# Patient Record
Sex: MALE | URBAN - METROPOLITAN AREA
[De-identification: names, ages, dates, MRNs, and addresses within clinical notes are randomized per-mention and may not be internally consistent; named-entity substitution may affect disease eponyms.]

---

## 2017-08-07 ENCOUNTER — TELEPHONE (OUTPATIENT)
Dept: SURGERY | Facility: AMBULATORY SURGERY CENTER | Age: 52
End: 2017-08-07

## 2017-08-07 NOTE — TELEPHONE ENCOUNTER
Called patient to collect HB payment of $3200 for Dr. Cardenas [facility] case DOS 08/18/17.     Left message for patient to call back with payment.

## 2017-08-15 ENCOUNTER — ANESTHESIA EVENT (OUTPATIENT)
Dept: SURGERY | Facility: AMBULATORY SURGERY CENTER | Age: 52
End: 2017-08-15

## 2017-08-15 NOTE — ANESTHESIA PREPROCEDURE EVALUATION
Anesthesia Evaluation     . Pt has had prior anesthetic. Type: General    History of anesthetic complications   - PONV        ROS/MED HX    ENT/Pulmonary: Comment: Desires Rhinoplasty and Chin Implant - neg pulmonary ROS     Neurologic:  - neg neurologic ROS     Cardiovascular:  - neg cardiovascular ROS       METS/Exercise Tolerance:     Hematologic:  - neg hematologic  ROS       Musculoskeletal:  - neg musculoskeletal ROS       GI/Hepatic:  - neg GI/hepatic ROS       Renal/Genitourinary:  - ROS Renal section negative       Endo:  - neg endo ROS       Psychiatric:  - neg psychiatric ROS       Infectious Disease:  - neg infectious disease ROS       Malignancy:      - no malignancy   Other:    (+) C-spine cleared: N/A, no H/O Chronic Pain,  - neg other ROS                 Physical Exam  Normal systems: cardiovascular and pulmonary    Airway   Mallampati: II  TM distance: >3 FB  Neck ROM: full    Dental   Comment: Cosmetic Veneers    Cardiovascular   Rhythm and rate: regular and normal      Pulmonary    breath sounds clear to auscultation                    Anesthesia Plan      History & Physical Review  History and physical reviewed and following examination; no interval change.    ASA Status:  1 .    NPO Status:  > 6 hours    Plan for General and ETT with Intravenous and Propofol induction. Maintenance will be TIVA.    PONV prophylaxis:  Ondansetron (or other 5HT-3) and Dexamethasone or Solumedrol       Postoperative Care  Postoperative pain management:  IV analgesics.      Consents  Anesthetic plan, risks, benefits and alternatives discussed with:  Patient.  Use of blood products discussed: No .   .                          .

## 2017-08-18 ENCOUNTER — HOSPITAL ENCOUNTER (OUTPATIENT)
Facility: AMBULATORY SURGERY CENTER | Age: 52
Discharge: HOME | End: 2017-08-18
Attending: PLASTIC SURGERY | Admitting: PLASTIC SURGERY

## 2017-08-18 ENCOUNTER — ANESTHESIA (OUTPATIENT)
Dept: SURGERY | Facility: AMBULATORY SURGERY CENTER | Age: 52
End: 2017-08-18

## 2017-08-18 VITALS
SYSTOLIC BLOOD PRESSURE: 100 MMHG | OXYGEN SATURATION: 95 % | DIASTOLIC BLOOD PRESSURE: 62 MMHG | RESPIRATION RATE: 12 BRPM | TEMPERATURE: 99.2 F

## 2017-08-18 DIAGNOSIS — T88.59XA NAUSEA AFTER ANESTHESIA, INITIAL ENCOUNTER: ICD-10-CM

## 2017-08-18 DIAGNOSIS — R52 PAIN: Primary | ICD-10-CM

## 2017-08-18 DIAGNOSIS — B99.9 INFECTION: ICD-10-CM

## 2017-08-18 DIAGNOSIS — R11.0 NAUSEA AFTER ANESTHESIA, INITIAL ENCOUNTER: ICD-10-CM

## 2017-08-18 PROCEDURE — 21120 GENIOPLASTY AUGMENTATION: CPT

## 2017-08-18 PROCEDURE — 30420 RECONSTRUCTION OF NOSE: CPT

## 2017-08-18 PROCEDURE — G8916 PT W IV AB GIVEN ON TIME: HCPCS

## 2017-08-18 PROCEDURE — G8907 PT DOC NO EVENTS ON DISCHARG: HCPCS

## 2017-08-18 PROCEDURE — 30400 RECONSTRUCTION OF NOSE: CPT

## 2017-08-18 RX ORDER — PROPOFOL 10 MG/ML
INJECTION, EMULSION INTRAVENOUS CONTINUOUS PRN
Status: DISCONTINUED | OUTPATIENT
Start: 2017-08-18 | End: 2017-08-18

## 2017-08-18 RX ORDER — FENTANYL CITRATE 50 UG/ML
25-50 INJECTION, SOLUTION INTRAMUSCULAR; INTRAVENOUS
Status: DISCONTINUED | OUTPATIENT
Start: 2017-08-18 | End: 2017-08-19 | Stop reason: HOSPADM

## 2017-08-18 RX ORDER — MEPERIDINE HYDROCHLORIDE 25 MG/ML
12.5 INJECTION INTRAMUSCULAR; INTRAVENOUS; SUBCUTANEOUS
Status: DISCONTINUED | OUTPATIENT
Start: 2017-08-18 | End: 2017-08-19 | Stop reason: HOSPADM

## 2017-08-18 RX ORDER — ONDANSETRON 4 MG/1
8 TABLET, ORALLY DISINTEGRATING ORAL EVERY 6 HOURS PRN
Qty: 8 TABLET | Refills: 0
Start: 2017-08-18

## 2017-08-18 RX ORDER — OXYCODONE AND ACETAMINOPHEN 5; 325 MG/1; MG/1
2 TABLET ORAL
Status: DISCONTINUED | OUTPATIENT
Start: 2017-08-18 | End: 2017-08-19 | Stop reason: HOSPADM

## 2017-08-18 RX ORDER — GABAPENTIN 300 MG/1
300 CAPSULE ORAL ONCE
Status: DISCONTINUED | OUTPATIENT
Start: 2017-08-18 | End: 2017-08-18

## 2017-08-18 RX ORDER — HYDROXYZINE PAMOATE 25 MG/1
25 CAPSULE ORAL EVERY 6 HOURS PRN
Qty: 30 CAPSULE | Refills: 0
Start: 2017-08-18

## 2017-08-18 RX ORDER — DEXAMETHASONE SODIUM PHOSPHATE 4 MG/ML
10 INJECTION, SOLUTION INTRA-ARTICULAR; INTRALESIONAL; INTRAMUSCULAR; INTRAVENOUS; SOFT TISSUE ONCE
Status: DISCONTINUED | OUTPATIENT
Start: 2017-08-18 | End: 2017-08-19 | Stop reason: HOSPADM

## 2017-08-18 RX ORDER — SODIUM CHLORIDE, SODIUM LACTATE, POTASSIUM CHLORIDE, CALCIUM CHLORIDE 600; 310; 30; 20 MG/100ML; MG/100ML; MG/100ML; MG/100ML
INJECTION, SOLUTION INTRAVENOUS CONTINUOUS
Status: DISCONTINUED | OUTPATIENT
Start: 2017-08-18 | End: 2017-08-19 | Stop reason: HOSPADM

## 2017-08-18 RX ORDER — CEFAZOLIN SODIUM 1 G/3ML
1 INJECTION, POWDER, FOR SOLUTION INTRAMUSCULAR; INTRAVENOUS SEE ADMIN INSTRUCTIONS
Status: DISCONTINUED | OUTPATIENT
Start: 2017-08-18 | End: 2017-08-19 | Stop reason: HOSPADM

## 2017-08-18 RX ORDER — ONDANSETRON 4 MG/1
4 TABLET, ORALLY DISINTEGRATING ORAL
Status: DISCONTINUED | OUTPATIENT
Start: 2017-08-18 | End: 2017-08-19 | Stop reason: HOSPADM

## 2017-08-18 RX ORDER — LIDOCAINE 40 MG/G
CREAM TOPICAL
Status: DISCONTINUED | OUTPATIENT
Start: 2017-08-18 | End: 2017-08-19 | Stop reason: HOSPADM

## 2017-08-18 RX ORDER — ONDANSETRON 4 MG/1
4 TABLET, ORALLY DISINTEGRATING ORAL EVERY 30 MIN PRN
Status: DISCONTINUED | OUTPATIENT
Start: 2017-08-18 | End: 2017-08-19 | Stop reason: HOSPADM

## 2017-08-18 RX ORDER — LIDOCAINE HYDROCHLORIDE 20 MG/ML
INJECTION, SOLUTION INFILTRATION; PERINEURAL PRN
Status: DISCONTINUED | OUTPATIENT
Start: 2017-08-18 | End: 2017-08-18

## 2017-08-18 RX ORDER — GINSENG 100 MG
CAPSULE ORAL PRN
Status: DISCONTINUED | OUTPATIENT
Start: 2017-08-18 | End: 2017-08-18 | Stop reason: HOSPADM

## 2017-08-18 RX ORDER — SCOLOPAMINE TRANSDERMAL SYSTEM 1 MG/1
1 PATCH, EXTENDED RELEASE TRANSDERMAL ONCE
Status: COMPLETED | OUTPATIENT
Start: 2017-08-18 | End: 2017-08-18

## 2017-08-18 RX ORDER — NALOXONE HYDROCHLORIDE 0.4 MG/ML
.1-.4 INJECTION, SOLUTION INTRAMUSCULAR; INTRAVENOUS; SUBCUTANEOUS
Status: DISCONTINUED | OUTPATIENT
Start: 2017-08-18 | End: 2017-08-19 | Stop reason: HOSPADM

## 2017-08-18 RX ORDER — PROPOFOL 10 MG/ML
INJECTION, EMULSION INTRAVENOUS PRN
Status: DISCONTINUED | OUTPATIENT
Start: 2017-08-18 | End: 2017-08-18

## 2017-08-18 RX ORDER — OXYCODONE AND ACETAMINOPHEN 5; 325 MG/1; MG/1
1-2 TABLET ORAL EVERY 4 HOURS PRN
Qty: 30 TABLET | Refills: 0
Start: 2017-08-18

## 2017-08-18 RX ORDER — LIDOCAINE HYDROCHLORIDE AND EPINEPHRINE 15; 5 MG/ML; UG/ML
INJECTION, SOLUTION EPIDURAL PRN
Status: DISCONTINUED | OUTPATIENT
Start: 2017-08-18 | End: 2017-08-18 | Stop reason: HOSPADM

## 2017-08-18 RX ORDER — CEFAZOLIN SODIUM 2 G/100ML
2 INJECTION, SOLUTION INTRAVENOUS
Status: COMPLETED | OUTPATIENT
Start: 2017-08-18 | End: 2017-08-18

## 2017-08-18 RX ORDER — ACETAMINOPHEN 10 MG/ML
INJECTION, SOLUTION INTRAVENOUS PRN
Status: DISCONTINUED | OUTPATIENT
Start: 2017-08-18 | End: 2017-08-18

## 2017-08-18 RX ORDER — DEXAMETHASONE SODIUM PHOSPHATE 4 MG/ML
10 INJECTION, SOLUTION INTRA-ARTICULAR; INTRALESIONAL; INTRAMUSCULAR; INTRAVENOUS; SOFT TISSUE ONCE
Status: COMPLETED | OUTPATIENT
Start: 2017-08-18 | End: 2017-08-18

## 2017-08-18 RX ORDER — ISOSULFAN BLUE 50 MG/5ML
INJECTION, SOLUTION SUBCUTANEOUS PRN
Status: DISCONTINUED | OUTPATIENT
Start: 2017-08-18 | End: 2017-08-18 | Stop reason: HOSPADM

## 2017-08-18 RX ORDER — KETOROLAC TROMETHAMINE 30 MG/ML
30 INJECTION, SOLUTION INTRAMUSCULAR; INTRAVENOUS EVERY 6 HOURS PRN
Status: DISCONTINUED | OUTPATIENT
Start: 2017-08-18 | End: 2017-08-19 | Stop reason: HOSPADM

## 2017-08-18 RX ORDER — BUPIVACAINE HYDROCHLORIDE 2.5 MG/ML
INJECTION, SOLUTION INFILTRATION; PERINEURAL PRN
Status: DISCONTINUED | OUTPATIENT
Start: 2017-08-18 | End: 2017-08-18 | Stop reason: HOSPADM

## 2017-08-18 RX ORDER — DEXAMETHASONE SODIUM PHOSPHATE 4 MG/ML
4 INJECTION, SOLUTION INTRA-ARTICULAR; INTRALESIONAL; INTRAMUSCULAR; INTRAVENOUS; SOFT TISSUE EVERY 10 MIN PRN
Status: DISCONTINUED | OUTPATIENT
Start: 2017-08-18 | End: 2017-08-19 | Stop reason: HOSPADM

## 2017-08-18 RX ORDER — HYDROMORPHONE HYDROCHLORIDE 1 MG/ML
.3-.5 INJECTION, SOLUTION INTRAMUSCULAR; INTRAVENOUS; SUBCUTANEOUS EVERY 10 MIN PRN
Status: DISCONTINUED | OUTPATIENT
Start: 2017-08-18 | End: 2017-08-19 | Stop reason: HOSPADM

## 2017-08-18 RX ORDER — HYDROXYZINE HYDROCHLORIDE 25 MG/1
25 TABLET, FILM COATED ORAL
Status: DISCONTINUED | OUTPATIENT
Start: 2017-08-18 | End: 2017-08-19 | Stop reason: HOSPADM

## 2017-08-18 RX ORDER — ONDANSETRON 2 MG/ML
4 INJECTION INTRAMUSCULAR; INTRAVENOUS EVERY 30 MIN PRN
Status: DISCONTINUED | OUTPATIENT
Start: 2017-08-18 | End: 2017-08-19 | Stop reason: HOSPADM

## 2017-08-18 RX ORDER — CEPHALEXIN 500 MG/1
500 CAPSULE ORAL 2 TIMES DAILY
Qty: 14 CAPSULE | Refills: 0
Start: 2017-08-18 | End: 2017-08-25

## 2017-08-18 RX ORDER — ALBUTEROL SULFATE 0.83 MG/ML
2.5 SOLUTION RESPIRATORY (INHALATION) EVERY 4 HOURS PRN
Status: DISCONTINUED | OUTPATIENT
Start: 2017-08-18 | End: 2017-08-19 | Stop reason: HOSPADM

## 2017-08-18 RX ORDER — ACETAMINOPHEN 325 MG/1
975 TABLET ORAL ONCE
Status: DISCONTINUED | OUTPATIENT
Start: 2017-08-18 | End: 2017-08-18 | Stop reason: CLARIF

## 2017-08-18 RX ORDER — HYDROCODONE BITARTRATE AND ACETAMINOPHEN 5; 325 MG/1; MG/1
1-2 TABLET ORAL EVERY 4 HOURS PRN
Qty: 30 TABLET | Refills: 0
Start: 2017-08-18

## 2017-08-18 RX ORDER — ONDANSETRON 2 MG/ML
INJECTION INTRAMUSCULAR; INTRAVENOUS PRN
Status: DISCONTINUED | OUTPATIENT
Start: 2017-08-18 | End: 2017-08-18

## 2017-08-18 RX ORDER — CEFAZOLIN SODIUM 2 G/100ML
2 INJECTION, SOLUTION INTRAVENOUS
Status: DISCONTINUED | OUTPATIENT
Start: 2017-08-18 | End: 2017-08-19 | Stop reason: HOSPADM

## 2017-08-18 RX ADMIN — Medication 1 MG: at 07:25

## 2017-08-18 RX ADMIN — SODIUM CHLORIDE, SODIUM LACTATE, POTASSIUM CHLORIDE, CALCIUM CHLORIDE: 600; 310; 30; 20 INJECTION, SOLUTION INTRAVENOUS at 12:16

## 2017-08-18 RX ADMIN — PROPOFOL 200 MG: 10 INJECTION, EMULSION INTRAVENOUS at 07:25

## 2017-08-18 RX ADMIN — SCOLOPAMINE TRANSDERMAL SYSTEM 1 PATCH: 1 PATCH, EXTENDED RELEASE TRANSDERMAL at 07:22

## 2017-08-18 RX ADMIN — LIDOCAINE HYDROCHLORIDE 60 MG: 20 INJECTION, SOLUTION INFILTRATION; PERINEURAL at 07:25

## 2017-08-18 RX ADMIN — CEFAZOLIN SODIUM 1 G: 2 INJECTION, SOLUTION INTRAVENOUS at 11:35

## 2017-08-18 RX ADMIN — Medication 0.5 MG: at 11:55

## 2017-08-18 RX ADMIN — PROPOFOL 175 MCG/KG/MIN: 10 INJECTION, EMULSION INTRAVENOUS at 07:25

## 2017-08-18 RX ADMIN — CEFAZOLIN SODIUM 2 G: 2 INJECTION, SOLUTION INTRAVENOUS at 07:35

## 2017-08-18 RX ADMIN — Medication 25 MG: at 07:25

## 2017-08-18 RX ADMIN — SODIUM CHLORIDE, SODIUM LACTATE, POTASSIUM CHLORIDE, CALCIUM CHLORIDE: 600; 310; 30; 20 INJECTION, SOLUTION INTRAVENOUS at 07:20

## 2017-08-18 RX ADMIN — DEXAMETHASONE SODIUM PHOSPHATE 10 MG: 4 INJECTION, SOLUTION INTRA-ARTICULAR; INTRALESIONAL; INTRAMUSCULAR; INTRAVENOUS; SOFT TISSUE at 07:30

## 2017-08-18 RX ADMIN — Medication 0.5 MG: at 09:35

## 2017-08-18 RX ADMIN — CEFAZOLIN SODIUM 1 G: 2 INJECTION, SOLUTION INTRAVENOUS at 09:35

## 2017-08-18 RX ADMIN — ONDANSETRON 4 MG: 2 INJECTION INTRAMUSCULAR; INTRAVENOUS at 11:55

## 2017-08-18 RX ADMIN — ACETAMINOPHEN 1000 MG: 10 INJECTION, SOLUTION INTRAVENOUS at 07:30

## 2017-08-18 NOTE — DISCHARGE INSTRUCTIONS
[facility]  Same-Day Surgery   Adult Discharge Orders & Instructions   For 24 hours after surgery  1. Get plenty of rest.  A responsible adult must stay with you for at least 24 hours after you leave the hospital.   2. Do not drive or use heavy equipment.  If you have weakness or tingling, don't drive or use heavy equipment until this feeling goes away.  3. Do not drink alcohol.  4. Avoid strenuous or risky activities.  Ask for help when climbing stairs.   5. You may feel lightheaded.  IF so, sit for a few minutes before standing.  Have someone help you get up.   6. If you have nausea (feel sick to your stomach): Drink only clear liquids such as apple juice, ginger ale, broth or 7-Up.  Rest may also help.  Be sure to drink enough fluids.  Move to a regular diet as you feel able.  7. You may have a slight fever. Call the doctor if your fever is over 100 F (37.7 C) (taken under the tongue) or lasts longer than 24 hours.  8. You may have a dry mouth, a sore throat, muscle aches or trouble sleeping.  These should go away after 24 hours.  9. Do not make important or legal decisions.   Call your doctor for any of the followin.  Signs of infection (fever, growing tenderness at the surgery site, a large amount of drainage or bleeding, severe pain, foul-smelling drainage, redness, swelling).    2. It has been over 8 to 10 hours since surgery and you are still not able to urinate (pass water).    3.  Headache for over 24 hours.    4.  Numbness, tingling or weakness the day after surgery (if you had spinal anesthesia).  To contact a Dr Cardenas call:    [phone] - Day  [phone] - After hours pager

## 2017-08-18 NOTE — ANESTHESIA POSTPROCEDURE EVALUATION
Patient: Crys Mendoza    Procedure(s):  Rhinoplasty and chin implant - Wound Class: II-Clean Contaminated    Diagnosis:cosmetic rhinoplasty, chin implant  Diagnosis Additional Information: No value filed.    Anesthesia Type:  General, ETT    Note:  Anesthesia Post Evaluation    Patient location during evaluation: PACU and Bedside  Patient participation: Able to fully participate in evaluation  Level of consciousness: awake  Pain management: adequate  Airway patency: patent  Cardiovascular status: acceptable  Respiratory status: acceptable  Hydration status: acceptable  PONV: none     Anesthetic complications: None          Last vitals:  Vitals:    08/18/17 1345 08/18/17 1400 08/18/17 1415   BP: 100/69 102/64 103/67   Resp: 14 12 12   Temp:      SpO2: 97% 94% 96%         Electronically Signed By: Nathen Knox MD  August 18, 2017  2:45 PM

## 2017-08-18 NOTE — IP AVS SNAPSHOT
[facility]    [address] [place] [address]    Phone:  [phone]                                       After Visit Summary   8/18/2017    Crys Mendoza    MRN: [id number]           After Visit Summary Signature Page     I have received my discharge instructions, and my questions have been answered. I have discussed any challenges I see with this plan with the nurse or doctor.    ..........................................................................................................................................  Patient/Patient Representative Signature      ..........................................................................................................................................  Patient Representative Print Name and Relationship to Patient    ..................................................               ................................................  Date                                            Time    ..........................................................................................................................................  Reviewed by Signature/Title    ...................................................              ..............................................  Date                                                            Time

## 2017-08-18 NOTE — IP AVS SNAPSHOT
MRN:[id number]                      After Visit Summary   8/18/2017    Crys Mendoza    MRN: [id number]           Thank you!     Thank you for choosing [facility] for your care. Our goal is always to provide you with excellent care. Hearing back from our patients is one way we can continue to improve our services. Please take a few minutes to complete the written survey that you may receive in the mail after you visit with us. Thank you!        Patient Information     Date Of Birth          1965        About your hospital stay     You were admitted on:  August 18, 2017 You last received care in the:  [facility]    You were discharged on:  August 18, 2017       Who to Call     For medical emergencies, please call 911.  For non-urgent questions about your medical care, please call your primary care provider or clinic, None  For questions related to your surgery, please call your surgery clinic        Attending Provider     Provider Specialty    Matthew Cardenas MD Plastic Surgery       Primary Care Provider    None Specified      After Care Instructions     Discharge Instructions       Resume pre procedure diet            Discharge Instructions       Lifting limit of  20  Pounds or activities that make you red in the face for 3-4 weeks            Discharge Instructions       Leave strap in place until tomorrow.Then discard foam and shower. Protect nasal splint from direct water but can wash face.            Discharge Instructions       Follow up with Surgeon in [place] at one week  Call clinic with any problems.            Ice to affected area       Ice to lower eyelids for up to 48 hours to decrease bruising            No Alcohol       No Alcohol for 24 hours post procedure            No Aspirin, Ibuprofen or Naproxen       No Aspirin, Ibuprofen or Naproxen products for 7 - 10 days following surgery            No driving or operating machinery       No driving or operating  machinery until day after procedure                  Further instructions from your care team       [facility]  Same-Day Surgery   Adult Discharge Orders & Instructions   For 24 hours after surgery  1. Get plenty of rest.  A responsible adult must stay with you for at least 24 hours after you leave the hospital.   2. Do not drive or use heavy equipment.  If you have weakness or tingling, don't drive or use heavy equipment until this feeling goes away.  3. Do not drink alcohol.  4. Avoid strenuous or risky activities.  Ask for help when climbing stairs.   5. You may feel lightheaded.  IF so, sit for a few minutes before standing.  Have someone help you get up.   6. If you have nausea (feel sick to your stomach): Drink only clear liquids such as apple juice, ginger ale, broth or 7-Up.  Rest may also help.  Be sure to drink enough fluids.  Move to a regular diet as you feel able.  7. You may have a slight fever. Call the doctor if your fever is over 100 F (37.7 C) (taken under the tongue) or lasts longer than 24 hours.  8. You may have a dry mouth, a sore throat, muscle aches or trouble sleeping.  These should go away after 24 hours.  9. Do not make important or legal decisions.   Call your doctor for any of the followin.  Signs of infection (fever, growing tenderness at the surgery site, a large amount of drainage or bleeding, severe pain, foul-smelling drainage, redness, swelling).    2. It has been over 8 to 10 hours since surgery and you are still not able to urinate (pass water).    3.  Headache for over 24 hours.    4.  Numbness, tingling or weakness the day after surgery (if you had spinal anesthesia).  To contact a Dr Cardenas call:    [phone] - Day  [phone] - After hours pager        Pending Results     No orders found from 2017 to 2017.            Admission Information     Date & Time Provider Department Dept. Phone    2017 Matthew Cardenas MD [place]  "[facility] [phone]      Your Vitals Were     Blood Pressure Temperature Respirations Pulse Oximetry          114/63 99.2  F (37.3  C) (Temporal) 10 100%        MyChart Information     [facility] lets you send messages to your doctor, view your test results, renew your prescriptions, schedule appointments and more. To sign up, go to [email or web address] . Click on \"Log in\" on the left side of the screen, which will take you to the Welcome page. Then click on \"Sign up Now\" on the right side of the page.     You will be asked to enter the access code listed below, as well as some personal information. Please follow the directions to create your username and password.     Your access code is: [email or web address]  Expires: 2017  1:10 PM     Your access code will  in 90 days. If you need help or a new code, please call your [facility] clinic or [phone].        Care EveryWhere ID     This is your Care EveryWhere ID. This could be used by other organizations to access your [facility] medical records  [id number]        Equal Access to Services     VITO JERNIGAN : Hadii emma Castro, waaxda luchris, qaybta kaalmada ulises, emile hatch . So [facility] [phone].    ATENCIÓN: Si habla español, tiene a vogt disposición servicios gratuitos de asistencia lingüística. Kita al [phone].    We comply with applicable federal civil rights laws and [place] laws. We do not discriminate on the basis of race, color, national origin, age, disability sex, sexual orientation or gender identity.               Review of your medicines      START taking        Dose / Directions    cephALEXin 500 MG capsule   Commonly known as:  KEFLEX   Used for:  Infection        Dose:  500 mg   Take 1 capsule (500 mg) by mouth 2 times daily for 7 days Start with 2 tabs this afternoon, one more at bedtime tonight. Filled as Duricef   Quantity:  14 capsule   Refills:  0       " HYDROcodone-acetaminophen 5-325 MG per tablet   Commonly known as:  NORCO   Used for:  Pain        Dose:  1-2 tablet   Take 1-2 tablets by mouth every 4 hours as needed for other (Moderate to Severe Pain) Use when not using percocet   Quantity:  30 tablet   Refills:  0       hydrOXYzine 25 MG capsule   Commonly known as:  VISTARIL   Used for:  Pain        Dose:  25 mg   Take 1 capsule (25 mg) by mouth every 6 hours as needed for other (take with norco or percocet to enhance pain relief)   Quantity:  30 capsule   Refills:  0       ondansetron 4 MG ODT tab   Commonly known as:  ZOFRAN-ODT   Used for:  Nausea after anesthesia, initial encounter        Dose:  8 mg   Take 2 tablets (8 mg) by mouth every 6 hours as needed for nausea   Quantity:  8 tablet   Refills:  0       oxyCODONE-acetaminophen 5-325 MG per tablet   Commonly known as:  PERCOCET   Used for:  Pain        Dose:  1-2 tablet   Take 1-2 tablets by mouth every 4 hours as needed for pain   Quantity:  30 tablet   Refills:  0            Where to get your medicines      Some of these will need a paper prescription and others can be bought over the counter. Ask your nurse if you have questions.     You don't need a prescription for these medications     cephALEXin 500 MG capsule    HYDROcodone-acetaminophen 5-325 MG per tablet    hydrOXYzine 25 MG capsule    ondansetron 4 MG ODT tab    oxyCODONE-acetaminophen 5-325 MG per tablet                Protect others around you: Learn how to safely use, store and throw away your medicines at www.disposemymeds.org.             Medication List: This is a list of all your medications and when to take them. Check marks below indicate your daily home schedule. Keep this list as a reference.      Medications           Morning Afternoon Evening Bedtime As Needed    cephALEXin 500 MG capsule   Commonly known as:  KEFLEX   Take 1 capsule (500 mg) by mouth 2 times daily for 7 days Start with 2 tabs this afternoon, one more at  bedtime tonight. Filled as Duricef                                HYDROcodone-acetaminophen 5-325 MG per tablet   Commonly known as:  NORCO   Take 1-2 tablets by mouth every 4 hours as needed for other (Moderate to Severe Pain) Use when not using percocet                                hydrOXYzine 25 MG capsule   Commonly known as:  VISTARIL   Take 1 capsule (25 mg) by mouth every 6 hours as needed for other (take with norco or percocet to enhance pain relief)                                ondansetron 4 MG ODT tab   Commonly known as:  ZOFRAN-ODT   Take 2 tablets (8 mg) by mouth every 6 hours as needed for nausea                                oxyCODONE-acetaminophen 5-325 MG per tablet   Commonly known as:  PERCOCET   Take 1-2 tablets by mouth every 4 hours as needed for pain

## 2017-08-18 NOTE — ANESTHESIA CARE TRANSFER NOTE
Patient: Crys Mendoza    Procedure(s):  Rhinoplasty and chin implant - Wound Class: II-Clean Contaminated    Diagnosis: cosmetic rhinoplasty, chin implant  Diagnosis Additional Information: No value filed.    Anesthesia Type:   General, ETT     Note:  Airway :Face Mask  Patient transferred to:PACU  Comments: To PACU, exchanging well, sats 96%, Face Tent with humidity, Report to RN.      Vitals: (Last set prior to Anesthesia Care Transfer)    CRNA VITALS  8/18/2017 1210 - 8/18/2017 1241      8/18/2017             Pulse: 98    SpO2: 100 %    Resp Rate (observed): 9                Electronically Signed By: VEE Chavez CRNA  August 18, 2017  12:41 PM

## 2017-08-18 NOTE — BRIEF OP NOTE
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

## 2017-09-12 NOTE — OP NOTE
DATE OF PROCEDURE:  08/18/2017      PREOPERATIVE DIAGNOSES:   1.  Cosmetic nasal deformity.   2.  Hypoplasia of chin or menton.      POSTOPERATIVE DIAGNOSES:   1.  Cosmetic nasal deformity.   2.  Hypoplasia of chin or menton.      OPERATION:   1.  Open septorhinoplasty, with osteotomies for reduction of dorsal nasal hump as well as medial and lateral osteotomies for narrowing of the bridge.   grafts were placed.  I used a Waylon tip graft with tip definition and treated the patient hanging columella by resection of his distal septum and bite use of percutaneous sutures to raise the columella up to the resected septum.   2.  Chin augmentation utilizing Implantech Terino style 1 large chin implant.  The [employer] reference number TSC1, lot #[id number].      ANESTHESIA:  General endotracheal.      INDICATIONS:  Crys Mendoza is a handsome 51-year-old gentleman and  of   and  descent.  The patient has brought photographs of noses that he finds attractive to help guide me intraoperatively.  The patient describes tethering of his nose in a downward fashion with a broad smile, it was explained to him this is due to fibers of the orbicularis oculi which attach to the nasal septum.  Therefore, when he smiles it pulls the nasal septum in a downward fashion.  I will use an incision beneath the patient's upper lip to remove these fibers and also to treat the patient's hanging columella.      The patient understands the type of operation I will be performing is known as open septorhinoplasty.  A transcolumellar stair step incision connected to intranasal incisions which follows the caudal margin of the lower lateral cartilages.  There is a large ellipse of skin off of the nasal framework and performing very exacting corrections to the patient's nasal dorsum tip and columella.  The transcolumellar incision heals beautifully and is virtually imperceptible.  The patient had a history  of previous otoplasty where I excised the skin and he did develop keloids.  Since I am de-projecting his nose I expect a tensionless closure and do not expect that he should ever develop any keloid in this location.  The patient was told the risks of the operation include a chance of bleeding, infection, hematoma, seroma, septal hematoma, asymmetry, and possible dissatisfaction with cosmetic outcome.  With regard to chin implant, patient was told that there is always a chance that the chin implant could shift in the perioperative period.  I try to make the pocket as exacting as possible.  I have not had to revise any of my chin augmentations but there is always a possibility.  Other possible complications of chin augmentation include bleeding, infection, hematoma, seroma, wound dehiscence, deformity from not reattaching the mentalis muscle known as a witch's chin deformity, and possible dissatisfaction with cosmetic outcome.  The patient was given a chance to ask questions and all were answered.  The patient provides an informed consent for the procedure.      PROCEDURE AND FINDINGS:  The patient was taken to the operating room, placed in supine position on the operating room table.  A successful general endotracheal anesthetic was then induced using an oral PRIYA tube was taped in the midline.  The columella of the nose was injected with 1% Xylocaine and 1:100,000 epinephrine.  The tip and dorsum was then injected in transcartilaginous fashion.  A headlight was used for the injection of the nasal septum itself.  Externally where the facial artery crosses the nasal alar groove, I again injected for vasoconstriction.  4% cocaine on neurosurgical pledgets were then placed in the patient's nasal vestibule again for septal vasoconstriction.  The patient's face was then prepped and draped in routine sterile fashion.  The eyes are protected using Lacrilube and 3M Tegaderm dressings.  The patient's head was then prepped and  draped in routine sterile fashion.  It should be noted the patient received 2 grams of Ancef and 10 mg of Decadron intravenously prior to commencing with surgery and this is repeated at 2-hour intervals during the operation.        A transcolumellar stair step incision was made and this was connected to intranasal incisions with all the caudal margins of the lower lateral cartilages.  The nose was then opened in fashion typical of open rhinoplasty.  Once the nose was opened dissection proceeded with tenotomy scissors following the lower lateral cartilages and upper lateral cartilages onto the nasal dorsum.  A periosteal  elevator was then used to elevate the soft tissues, including periosteum off the nasal bone itself at the radix.      Attention was then directed to the nasal septum.  Using a fiberoptic headlight illumination, a Rippey incision was made over the caudal margin of septum on the patient's left side.  Mucoperichondrial flap was developed medially over the cartilage first on the left side back to the perpendicular plate of the ethmoid.  A 1.5 cm L-shaped strut was left over the caudal septum and 1.8 cm strut was left over the dorsal nasal septum for support.  The [place] elevator was then used to develop the right side, the mucoperichondrial flap back to the perpendicular plate of ethmoid.  The [place] elevator was used to elevate the septum at its junction with the perpendicular plate of the ethmoid as well as from the perpendicular crest of the maxilla and the vomer.  The intervening piece of cartilage was removed from the nose and placed in a saline-soaked sponge on the back table.      The dorsum of the nose was then visualized using Aufricht retractor.  A #15 blade were used to free the upper lateral cartilages from the nasal septum.  I then used a [place] elevator intranasally to connect these 2 dissections.  A [place] elevator was then used to dissect the mucoperichondrium from the underside of the  upper lateral cartilages.  This was done to allow for placement of  grafts.   grafts were fashioned on the back table approximately 3 mm in height and 2.5 cm in length.      At this point, I removed the patient's dorsal nasal hump using a 4 mm osteotome.  Rasping of the patient's dorsal nasal hump was performed using a adwoa rasp.  Next, my  grafts were inserted and sutured to each side of the nasal septum in between the upper lateral cartilages to make a sandwich with tissue layers are as follows:  Upper lateral cartilage,  graft, septum,  graft, and upper lateral cartilage.  They were sewn in place using 4-0 PDS sutures in mattressing type fashion.  Next, the septal access incision was closed using 5-0 chromic gut suture in interrupted fashion.  3-0 chromic gut sutures then placed in quilting type fashion, going back and forth from each side of the nostril approximating my septonasal mucoperichondrial flaps to obliterate the space and decrease the chance of a septal hematoma.  Next, attention was directed to the patient's tip.  A caliper was used to measure a 6 mm rim strip, 6 mm of lower lateral cartilage left in place.  A tenotomy scissors was used to dissect the cephalic margin of upper lateral cartilage and this was excised.  At this point, I made an incision in the patient's oral vestibule at the location of the anterior nasal spine.  The anterior nasal spine itself was resected using a rongeur forceps.  I then divided the slips of the orbicularis oris muscle which inserted into the patient's nasal septum and further resected approximately 4 mm of them using a [place] tip Bovie electrocautery.  I used the patient's own native septum as a columellar strut graft.  I then fixed the medial crura of the lower lateral cartilages to the septum using 25-gauge needles.  Mattressing type sutures of 4-0 PDS sutures were used to hold the medial crura in exactly the proper  position with relation to the cartilaginous distal septum.  Next, intracuticular lobular sutures placed in mattressing fashion between the septum and medial crura lower lateral cartilages to determine the angle of splay of the lower lateral cartilages.  The mucosa was then dissected free from the domes of the lower lateral cartilages using a Storz stitch scissors.  Tip defining sutures were then placed using 5-0 PDS suture in mattressing type fashion.  A dome gathering suture was placed using 5-0 PDS suture in mattressing type fashion.  Next a Waylon type tip graft was fashioned on the back table.  The edges were gently smoothed using a Bovie scratch pad.  This Waylon tip graft was left high at the tip so as to avoid supertip deformity.  This also defined the nasolabial angle and elevation of the tip.      The intraoral incision was then closed using chromic gut suture in running continuous fashion.      Attention was directed to the osteotomies.  Lateral aspect of the nose injected with 1% Xylocaine with 1:100,000 epinephrine.  This was allowed to remain in place for approximately 5 minutes.  Incisions were made in the piriform apertures.  Lateral osteotomies were made using a 4 mm osteotome and these were connected to medial osteotomies performed with a 2 mm osteotome in percutaneous perforating fashion.  Using a saline-soaked sponge performed infracture the patient's bony nasal pyramid to close the open roof deformity.  The nose was then copiously irrigated with normal saline.  I then injected the nose with topical thrombin for hemostasis.  Closure of the nose consisted of closure of the transcolumellar incision with 6-0 Monocryl suture in buried and interrupted intracuticular fashion.  The skin of the columella closed using 6-0 Prolene sutures in interrupted fashion.  The intranasal incisions were closed using 5-0 chromic sutures in interrupted fashion.  0.25% Marcaine was then injected bilaterally at the  infraorbital nerves and beneath the upper lip for local analgesia.  The nose was taped using quarter and half-inch Steri-Strips.  A [place] nasal splint was then placed over the dorsum of the nose.  Bacitracin was placed on the inside of the nose using a Q-tip.      Attention was then directed to the chin implant portion of the operation.  The oral vestibule was injected with 1% Xylocaine with 1:100,000 epinephrine.  Incision was made directly at the depths of the oral vestibule leaving a cuff of mucosa approximately 5 mm in height.  I then transected the patient's mentalis muscle, again leaving approximately 5 mm of muscle attached to the menton of the chin for reapproximation of the mentalis at closure.  Using Kristopher periosteal divider specifically designed for chin implant I developed my tunnels for the chin implant.  The submental nerves could be visualized and were completely avoided this operation.  The pocket was made in very exact fashion.  I tried a medium chin implant and I did not believe this gave him quite enough projection.  I then tried a large sizer and this gave good projection to his chin and the square mandible that he showed and he desired in photographs.  At this point, the dissection site was copiously irrigated with Betadine.  The chin implant itself was soaked in Betadine, then inserted to place.  The [employer] reference number and lot number can be found at the beginning of this operative report.  I used a large Terino style I chin implant.  The chin implant was sewn to the periosteum of the mandible using 3-0 Vicryl suture.  Next, the mentalis was reapproximated using 3-0 Vicryl suture in interrupted fashion.  The submucosa was then closed using 4-0 Vicryl suture in buried and interrupted fashion.  The mucosa was then closed using 4-0 chromic gut suture in running and continuous fashion and tied.  The patient was placed in a jaw bra over Epifoam for compression and to minimize ecchymosis.       ESTIMATED BLOOD LOSS:  50 mL.        The patient received 2 liters of intravenous fluid.  The patient had 700 mL of urine output.        OPERATIVE TIME:  The operation began at 7:50 a.m. on-call of timeout.  The operation was completed at 12:10 p.m.  The patient was transferred to his operative cart at 12:20 p.m.  It should be noted the operation was scheduled for 5 hours beginning at 7:30 a.m.         DOMINICK LAUGHLIN MD             D: 2017 11:35   T: 2017 14:14   MT: [id number]      Name:     CHANTEL HEARD   MRN:      [id number]        Account:        [id number]   :      1965           Procedure Date: 2017      Document: [id number]

## 2018-07-05 ENCOUNTER — ANESTHESIA EVENT (OUTPATIENT)
Dept: SURGERY | Facility: AMBULATORY SURGERY CENTER | Age: 53
End: 2018-07-05

## 2018-07-06 ENCOUNTER — ANESTHESIA (OUTPATIENT)
Dept: SURGERY | Facility: AMBULATORY SURGERY CENTER | Age: 53
End: 2018-07-06

## 2018-07-06 ENCOUNTER — HOSPITAL ENCOUNTER (OUTPATIENT)
Facility: AMBULATORY SURGERY CENTER | Age: 53
Discharge: HOME | End: 2018-07-06
Attending: PLASTIC SURGERY | Admitting: PLASTIC SURGERY

## 2018-07-06 ENCOUNTER — SURGERY (OUTPATIENT)
Age: 53
End: 2018-07-06

## 2018-07-06 VITALS
RESPIRATION RATE: 14 BRPM | WEIGHT: 141 LBS | HEIGHT: 69 IN | OXYGEN SATURATION: 92 % | TEMPERATURE: 97.5 F | BODY MASS INDEX: 20.88 KG/M2 | DIASTOLIC BLOOD PRESSURE: 76 MMHG | SYSTOLIC BLOOD PRESSURE: 110 MMHG

## 2018-07-06 DIAGNOSIS — R11.0 NAUSEA AFTER ANESTHESIA, INITIAL ENCOUNTER: ICD-10-CM

## 2018-07-06 DIAGNOSIS — R52 PAIN: Primary | ICD-10-CM

## 2018-07-06 DIAGNOSIS — B99.9 INFECTION: ICD-10-CM

## 2018-07-06 DIAGNOSIS — T88.59XA NAUSEA AFTER ANESTHESIA, INITIAL ENCOUNTER: ICD-10-CM

## 2018-07-06 PROCEDURE — 36000141 ZZH SURGERY LEVEL COSMETIC 150 MIN

## 2018-07-06 PROCEDURE — G8907 PT DOC NO EVENTS ON DISCHARG: HCPCS

## 2018-07-06 PROCEDURE — G8916 PT W IV AB GIVEN ON TIME: HCPCS

## 2018-07-06 RX ORDER — ALBUTEROL SULFATE 0.83 MG/ML
2.5 SOLUTION RESPIRATORY (INHALATION) EVERY 4 HOURS PRN
Status: DISCONTINUED | OUTPATIENT
Start: 2018-07-06 | End: 2018-07-07 | Stop reason: HOSPADM

## 2018-07-06 RX ORDER — ONDANSETRON 2 MG/ML
4 INJECTION INTRAMUSCULAR; INTRAVENOUS EVERY 30 MIN PRN
Status: DISCONTINUED | OUTPATIENT
Start: 2018-07-06 | End: 2018-07-07 | Stop reason: HOSPADM

## 2018-07-06 RX ORDER — SODIUM CHLORIDE, SODIUM LACTATE, POTASSIUM CHLORIDE, CALCIUM CHLORIDE 600; 310; 30; 20 MG/100ML; MG/100ML; MG/100ML; MG/100ML
INJECTION, SOLUTION INTRAVENOUS CONTINUOUS
Status: DISCONTINUED | OUTPATIENT
Start: 2018-07-06 | End: 2018-07-07 | Stop reason: HOSPADM

## 2018-07-06 RX ORDER — CEFAZOLIN SODIUM 2 G/100ML
2 INJECTION, SOLUTION INTRAVENOUS
Status: COMPLETED | OUTPATIENT
Start: 2018-07-06 | End: 2018-07-06

## 2018-07-06 RX ORDER — KETOROLAC TROMETHAMINE 30 MG/ML
30 INJECTION, SOLUTION INTRAMUSCULAR; INTRAVENOUS EVERY 6 HOURS PRN
Status: DISCONTINUED | OUTPATIENT
Start: 2018-07-06 | End: 2018-07-07 | Stop reason: HOSPADM

## 2018-07-06 RX ORDER — ONDANSETRON 4 MG/1
4 TABLET, ORALLY DISINTEGRATING ORAL EVERY 6 HOURS PRN
Qty: 8 TABLET | Refills: 0
Start: 2018-07-06

## 2018-07-06 RX ORDER — ONDANSETRON 4 MG/1
4 TABLET, ORALLY DISINTEGRATING ORAL
Status: DISCONTINUED | OUTPATIENT
Start: 2018-07-06 | End: 2018-07-07 | Stop reason: HOSPADM

## 2018-07-06 RX ORDER — LIDOCAINE HYDROCHLORIDE 20 MG/ML
INJECTION, SOLUTION INFILTRATION; PERINEURAL PRN
Status: DISCONTINUED | OUTPATIENT
Start: 2018-07-06 | End: 2018-07-06

## 2018-07-06 RX ORDER — CEFAZOLIN SODIUM 1 G/3ML
1 INJECTION, POWDER, FOR SOLUTION INTRAMUSCULAR; INTRAVENOUS SEE ADMIN INSTRUCTIONS
Status: DISCONTINUED | OUTPATIENT
Start: 2018-07-06 | End: 2018-07-07 | Stop reason: HOSPADM

## 2018-07-06 RX ORDER — DEXAMETHASONE SODIUM PHOSPHATE 10 MG/ML
INJECTION, SOLUTION INTRAMUSCULAR; INTRAVENOUS PRN
Status: DISCONTINUED | OUTPATIENT
Start: 2018-07-06 | End: 2018-07-06

## 2018-07-06 RX ORDER — LIDOCAINE 40 MG/G
CREAM TOPICAL
Status: DISCONTINUED | OUTPATIENT
Start: 2018-07-06 | End: 2018-07-07 | Stop reason: HOSPADM

## 2018-07-06 RX ORDER — ONDANSETRON 4 MG/1
4 TABLET, ORALLY DISINTEGRATING ORAL EVERY 30 MIN PRN
Status: DISCONTINUED | OUTPATIENT
Start: 2018-07-06 | End: 2018-07-07 | Stop reason: HOSPADM

## 2018-07-06 RX ORDER — OXYCODONE AND ACETAMINOPHEN 5; 325 MG/1; MG/1
2 TABLET ORAL
Status: DISCONTINUED | OUTPATIENT
Start: 2018-07-06 | End: 2018-07-07 | Stop reason: HOSPADM

## 2018-07-06 RX ORDER — FENTANYL CITRATE 50 UG/ML
25-50 INJECTION, SOLUTION INTRAMUSCULAR; INTRAVENOUS
Status: DISCONTINUED | OUTPATIENT
Start: 2018-07-06 | End: 2018-07-07 | Stop reason: HOSPADM

## 2018-07-06 RX ORDER — GINSENG 100 MG
CAPSULE ORAL PRN
Status: DISCONTINUED | OUTPATIENT
Start: 2018-07-06 | End: 2018-07-06 | Stop reason: HOSPADM

## 2018-07-06 RX ORDER — ACETAMINOPHEN 325 MG/1
975 TABLET ORAL ONCE
Status: COMPLETED | OUTPATIENT
Start: 2018-07-06 | End: 2018-07-06

## 2018-07-06 RX ORDER — NALOXONE HYDROCHLORIDE 0.4 MG/ML
.1-.4 INJECTION, SOLUTION INTRAMUSCULAR; INTRAVENOUS; SUBCUTANEOUS
Status: DISCONTINUED | OUTPATIENT
Start: 2018-07-06 | End: 2018-07-07 | Stop reason: HOSPADM

## 2018-07-06 RX ORDER — MEPERIDINE HYDROCHLORIDE 25 MG/ML
12.5 INJECTION INTRAMUSCULAR; INTRAVENOUS; SUBCUTANEOUS
Status: DISCONTINUED | OUTPATIENT
Start: 2018-07-06 | End: 2018-07-07 | Stop reason: HOSPADM

## 2018-07-06 RX ORDER — ONDANSETRON 2 MG/ML
INJECTION INTRAMUSCULAR; INTRAVENOUS PRN
Status: DISCONTINUED | OUTPATIENT
Start: 2018-07-06 | End: 2018-07-06

## 2018-07-06 RX ORDER — HYDROXYZINE HYDROCHLORIDE 25 MG/1
25 TABLET, FILM COATED ORAL
Status: DISCONTINUED | OUTPATIENT
Start: 2018-07-06 | End: 2018-07-07 | Stop reason: HOSPADM

## 2018-07-06 RX ORDER — LIDOCAINE HYDROCHLORIDE AND EPINEPHRINE 10; 10 MG/ML; UG/ML
INJECTION, SOLUTION INFILTRATION; PERINEURAL PRN
Status: DISCONTINUED | OUTPATIENT
Start: 2018-07-06 | End: 2018-07-06 | Stop reason: HOSPADM

## 2018-07-06 RX ORDER — BUPIVACAINE HYDROCHLORIDE AND EPINEPHRINE 2.5; 5 MG/ML; UG/ML
INJECTION, SOLUTION EPIDURAL; INFILTRATION; INTRACAUDAL; PERINEURAL PRN
Status: DISCONTINUED | OUTPATIENT
Start: 2018-07-06 | End: 2018-07-06 | Stop reason: HOSPADM

## 2018-07-06 RX ORDER — DEXAMETHASONE SODIUM PHOSPHATE 4 MG/ML
4 INJECTION, SOLUTION INTRA-ARTICULAR; INTRALESIONAL; INTRAMUSCULAR; INTRAVENOUS; SOFT TISSUE EVERY 10 MIN PRN
Status: DISCONTINUED | OUTPATIENT
Start: 2018-07-06 | End: 2018-07-07 | Stop reason: HOSPADM

## 2018-07-06 RX ORDER — FENTANYL CITRATE 50 UG/ML
INJECTION, SOLUTION INTRAMUSCULAR; INTRAVENOUS PRN
Status: DISCONTINUED | OUTPATIENT
Start: 2018-07-06 | End: 2018-07-06

## 2018-07-06 RX ORDER — GABAPENTIN 300 MG/1
300 CAPSULE ORAL ONCE
Status: COMPLETED | OUTPATIENT
Start: 2018-07-06 | End: 2018-07-06

## 2018-07-06 RX ORDER — DEXAMETHASONE SODIUM PHOSPHATE 4 MG/ML
10 INJECTION, SOLUTION INTRA-ARTICULAR; INTRALESIONAL; INTRAMUSCULAR; INTRAVENOUS; SOFT TISSUE ONCE
Status: DISCONTINUED | OUTPATIENT
Start: 2018-07-06 | End: 2018-07-07 | Stop reason: HOSPADM

## 2018-07-06 RX ORDER — CEPHALEXIN 500 MG/1
500 CAPSULE ORAL 2 TIMES DAILY
Qty: 14 CAPSULE | Refills: 0
Start: 2018-07-06 | End: 2018-07-13

## 2018-07-06 RX ORDER — PROPOFOL 10 MG/ML
INJECTION, EMULSION INTRAVENOUS CONTINUOUS PRN
Status: DISCONTINUED | OUTPATIENT
Start: 2018-07-06 | End: 2018-07-06

## 2018-07-06 RX ORDER — OXYCODONE AND ACETAMINOPHEN 5; 325 MG/1; MG/1
1-2 TABLET ORAL EVERY 4 HOURS PRN
Qty: 30 TABLET | Refills: 0
Start: 2018-07-06

## 2018-07-06 RX ORDER — PROPOFOL 10 MG/ML
INJECTION, EMULSION INTRAVENOUS PRN
Status: DISCONTINUED | OUTPATIENT
Start: 2018-07-06 | End: 2018-07-06

## 2018-07-06 RX ORDER — HYDROXYZINE PAMOATE 25 MG/1
25 CAPSULE ORAL EVERY 6 HOURS PRN
Qty: 30 CAPSULE | Refills: 0
Start: 2018-07-06

## 2018-07-06 RX ORDER — HYDROMORPHONE HYDROCHLORIDE 1 MG/ML
.3-.5 INJECTION, SOLUTION INTRAMUSCULAR; INTRAVENOUS; SUBCUTANEOUS EVERY 10 MIN PRN
Status: DISCONTINUED | OUTPATIENT
Start: 2018-07-06 | End: 2018-07-07 | Stop reason: HOSPADM

## 2018-07-06 RX ADMIN — FENTANYL CITRATE 50 MCG: 50 INJECTION, SOLUTION INTRAMUSCULAR; INTRAVENOUS at 09:33

## 2018-07-06 RX ADMIN — DEXAMETHASONE SODIUM PHOSPHATE 36 MG: 4 INJECTION, SOLUTION INTRA-ARTICULAR; INTRALESIONAL; INTRAMUSCULAR; INTRAVENOUS; SOFT TISSUE at 09:43

## 2018-07-06 RX ADMIN — FENTANYL CITRATE 50 MCG: 50 INJECTION, SOLUTION INTRAMUSCULAR; INTRAVENOUS at 07:26

## 2018-07-06 RX ADMIN — SODIUM CHLORIDE, SODIUM LACTATE, POTASSIUM CHLORIDE, CALCIUM CHLORIDE: 600; 310; 30; 20 INJECTION, SOLUTION INTRAVENOUS at 07:14

## 2018-07-06 RX ADMIN — CEFAZOLIN SODIUM 2 G: 2 INJECTION, SOLUTION INTRAVENOUS at 07:33

## 2018-07-06 RX ADMIN — LIDOCAINE HYDROCHLORIDE AND EPINEPHRINE 13 ML: 10; 10 INJECTION, SOLUTION INFILTRATION; PERINEURAL at 07:52

## 2018-07-06 RX ADMIN — BUPIVACAINE HYDROCHLORIDE AND EPINEPHRINE 20 ML: 2.5; 5 INJECTION, SOLUTION EPIDURAL; INFILTRATION; INTRACAUDAL; PERINEURAL at 09:27

## 2018-07-06 RX ADMIN — ONDANSETRON 4 MG: 2 INJECTION INTRAMUSCULAR; INTRAVENOUS at 09:15

## 2018-07-06 RX ADMIN — Medication 25 MG: at 07:26

## 2018-07-06 RX ADMIN — DEXAMETHASONE SODIUM PHOSPHATE 10 MG: 10 INJECTION, SOLUTION INTRAMUSCULAR; INTRAVENOUS at 07:26

## 2018-07-06 RX ADMIN — PROPOFOL 200 MG: 10 INJECTION, EMULSION INTRAVENOUS at 07:26

## 2018-07-06 RX ADMIN — Medication 1 TUBE: at 09:28

## 2018-07-06 RX ADMIN — GABAPENTIN 300 MG: 300 CAPSULE ORAL at 07:00

## 2018-07-06 RX ADMIN — LIDOCAINE HYDROCHLORIDE 60 MG: 20 INJECTION, SOLUTION INFILTRATION; PERINEURAL at 07:26

## 2018-07-06 RX ADMIN — PROPOFOL 200 MCG/KG/MIN: 10 INJECTION, EMULSION INTRAVENOUS at 07:30

## 2018-07-06 RX ADMIN — ACETAMINOPHEN 975 MG: 325 TABLET ORAL at 07:00

## 2018-07-06 ASSESSMENT — LIFESTYLE VARIABLES: TOBACCO_USE: 0

## 2018-07-06 ASSESSMENT — COPD QUESTIONNAIRES: COPD: 0

## 2018-07-06 NOTE — IP AVS SNAPSHOT
MRN:[id number]                      After Visit Summary   7/6/2018    Crys Mendoza    MRN: [id number]           Thank you!     Thank you for choosing [facility] for your care. Our goal is always to provide you with excellent care. Hearing back from our patients is one way we can continue to improve our services. Please take a few minutes to complete the written survey that you may receive in the mail after you visit with us. Thank you!        Patient Information     Date Of Birth          1965        About your hospital stay     You were admitted on:  July 6, 2018 [place]  [facility] [place] on:  July 6, 2018       Who to Call     For medical emergencies, please call 911.  For non-urgent questions about your medical care, please call your primary care provider or clinic, [phone]  For questions related to your surgery, please call your surgery clinic        Attending Provider     Provider Specialty    Matthew Cardenas MD Plastic Surgery       Primary Care Provider Office Phone # Fax #    Ever Desai [phone]      After Care Instructions     Discharge Instructions       Resume pre procedure diet            Discharge Instructions       Lifting limit of  20  poundsfor 2 weeks. Avoid any activity that makes you red in the face            Discharge Instructions       Leave splint in place until seen in clinic next week, apply vaseline to stitches regularly during the day            Discharge Instructions       Follow up with Surgeon in 3 weeks. Appt for splint and stitch removal next week in [place].  Call clinic with any problems.            Ice to affected area       Ice PRN, to lower eyelids, not over splint            No Alcohol       No Alcohol for 24 hours post procedure            No Aspirin, Ibuprofen or Naproxen       No Aspirin, Ibuprofen or Naproxen products for 7 - 10 days following surgery             No driving or operating machinery       No driving or operating machinery until day after procedure                  Further instructions from your care team       [facility]  Same-Day Surgery   Adult Discharge Orders & Instructions   For 24 hours after surgery  1. Get plenty of rest.  A responsible adult must stay with you for at least 24 hours after you leave the hospital.   2. Do not drive or use heavy equipment.  If you have weakness or tingling, don't drive or use heavy equipment until this feeling goes away.  3. Do not drink alcohol.  4. Avoid strenuous or risky activities.  Ask for help when climbing stairs.   5. You may feel lightheaded.  IF so, sit for a few minutes before standing.  Have someone help you get up.   6. If you have nausea (feel sick to your stomach): Drink only clear liquids such as apple juice, ginger ale, broth or 7-Up.  Rest may also help.  Be sure to drink enough fluids.  Move to a regular diet as you feel able.  7. You may have a slight fever. Call the doctor if your fever is over 100 F (37.7 C) (taken under the tongue) or lasts longer than 24 hours.  8. You may have a dry mouth, a sore throat, muscle aches or trouble sleeping.  These should go away after 24 hours.  9. Do not make important or legal decisions.   Call your doctor for any of the followin.  Signs of infection (fever, growing tenderness at the surgery site, a large amount of drainage or bleeding, severe pain, foul-smelling drainage, redness, swelling).    2. It has been over 8 to 10 hours since surgery and you are still not able to urinate (pass water).    3.  Headache for over 24 hours.    4.  Numbness, tingling or weakness the day after surgery (if you had spinal anesthesia).      Pending Results     No orders found from 2018 to 2018.            Admission Information     Date & Time Provider Department Dept. Phone    2018 Matthew Cardenas MD [facility] [place]  "[phone]      Your Vitals Were     Blood Pressure Temperature Respirations Height Weight Pulse Oximetry    108/65 98.1  F (36.7  C) (Tympanic) 20 1.746 m (5' 8.75\") 64 kg (141 lb) 96%    BMI (Body Mass Index)                   20.97 kg/m2           [employer] Information     [employer] lets you send messages to your doctor, view your test results, renew your prescriptions, schedule appointments and more. To sign up, go to [email or web address] . Click on \"Log in\" on the left side of the screen, which will take you to the Welcome page. Then click on \"Sign up Now\" on the right side of the page.     You will be asked to enter the access code listed below, as well as some personal information. Please follow the directions to create your username and password.     Your access code is: [email or web address]  Expires: 10/4/2018 10:09 AM     Your access code will  in 90 days. If you need help or a new code, please call your [facility] clinic or [phone].        Care EveryWhere ID     This is your Care EveryWhere ID. This could be used by other organizations to access your [facility] medical records  [id number]        Equal Access to Services     VITO JERNIGAN AH: Leslye mckeono Sobeckyali, waaxda luqadaha, qaybta kaalmada adeegyada, emile sagastume. So [facility] [phone].    ATENCIÓN: Si habla español, tiene a vogt disposición servicios gratuitos de asistencia lingüística. Llame al [phone].    We comply with applicable federal civil rights laws and [place] laws. We do not discriminate on the basis of race, color, national origin, age, disability, sex, sexual orientation, or gender identity.               Review of your medicines      UNREVIEWED medicines. Ask your doctor about these medicines        Dose / Directions    ALLOPURINOL PO        Dose:  100 mg   Take 100 mg by mouth daily   Refills:  0       HYDROcodone-acetaminophen 5-325 MG per tablet   Commonly known as:  NORCO   Used for:  Pain "        Dose:  1-2 tablet   Take 1-2 tablets by mouth every 4 hours as needed for other (Moderate to Severe Pain) Use when not using percocet   Quantity:  30 tablet   Refills:  0       * hydrOXYzine 25 MG capsule   Commonly known as:  VISTARIL   This may have changed:  Another medication with the same name was added. Make sure you understand how and when to take each.   Used for:  Pain   Ask about: Which instructions should I use?        Dose:  25 mg   Take 1 capsule (25 mg) by mouth every 6 hours as needed for other (take with norco or percocet to enhance pain relief)   Quantity:  30 capsule   Refills:  0       * hydrOXYzine 25 MG capsule   Commonly known as:  VISTARIL   This may have changed:  You were already taking a medication with the same name, and this prescription was added. Make sure you understand how and when to take each.   Used for:  Pain   Ask about: Which instructions should I use?        Dose:  25 mg   Take 1 capsule (25 mg) by mouth every 6 hours as needed for itching or other Take with percocet to enhance pain relief   Quantity:  30 capsule   Refills:  0       * ondansetron 4 MG ODT tab   Commonly known as:  ZOFRAN-ODT   This may have changed:  Another medication with the same name was added. Make sure you understand how and when to take each.   Used for:  Nausea after anesthesia, initial encounter   Ask about: Which instructions should I use?        Dose:  8 mg   Take 2 tablets (8 mg) by mouth every 6 hours as needed for nausea   Quantity:  8 tablet   Refills:  0       * ondansetron 4 MG ODT tab   Commonly known as:  ZOFRAN-ODT   This may have changed:  You were already taking a medication with the same name, and this prescription was added. Make sure you understand how and when to take each.   Used for:  Nausea after anesthesia, initial encounter   Ask about: Which instructions should I use?        Dose:  4 mg   Take 1 tablet (4 mg) by mouth every 6 hours as needed for nausea   Quantity:  8  tablet   Refills:  0       * oxyCODONE-acetaminophen 5-325 MG per tablet   Commonly known as:  PERCOCET   This may have changed:  Another medication with the same name was added. Make sure you understand how and when to take each.   Used for:  Pain   Ask about: Which instructions should I use?        Dose:  1-2 tablet   Take 1-2 tablets by mouth every 4 hours as needed for pain   Quantity:  30 tablet   Refills:  0       * oxyCODONE-acetaminophen 5-325 MG per tablet   Commonly known as:  PERCOCET   This may have changed:  You were already taking a medication with the same name, and this prescription was added. Make sure you understand how and when to take each.   Used for:  Pain   Ask about: Which instructions should I use?        Dose:  1-2 tablet   Take 1-2 tablets by mouth every 4 hours as needed for pain   Quantity:  30 tablet   Refills:  0       * Notice:  This list has 6 medication(s) that are the same as other medications prescribed for you. Read the directions carefully, and ask your doctor or other care provider to review them with you.      START taking        Dose / Directions    cephALEXin 500 MG capsule   Commonly known as:  KEFLEX   Used for:  Infection        Dose:  500 mg   Take 1 capsule (500 mg) by mouth 2 times daily for 7 days Start with 2 tabs this afternoon, one more at bedtime tonight. Filled as duricef.   Quantity:  14 capsule   Refills:  0            Where to get your medicines      Some of these will need a paper prescription and others can be bought over the counter. Ask your nurse if you have questions.     You don't need a prescription for these medications     cephALEXin 500 MG capsule    hydrOXYzine 25 MG capsule    ondansetron 4 MG ODT tab    oxyCODONE-acetaminophen 5-325 MG per tablet                Protect others around you: Learn how to safely use, store and throw away your medicines at www.disposemymeds.org.        ANTIBIOTIC INSTRUCTION     You've Been Prescribed an Antibiotic -  Now What?  Your healthcare team thinks that you or your loved one might have an infection. Some infections can be treated with antibiotics, which are powerful, life-saving drugs. Like all medications, antibiotics have side effects and should only be used when necessary. There are some important things you should know about your antibiotic treatment.      Your healthcare team may run tests before you start taking an antibiotic.    Your team may take samples (e.g., from your blood, urine or other areas) to run tests to look for bacteria. These test can be important to determine if you need an antibiotic at all and, if you do, which antibiotic will work best.      Within a few days, your healthcare team might change or even stop your antibiotic.    Your team may start you on an antibiotic while they are working to find out what is making you sick.    Your team might change your antibiotic because test results show that a different antibiotic would be better to treat your infection.    In some cases, once your team has more information, they learn that you do not need an antibiotic at all. They may find out that you don't have an infection, or that the antibiotic you're taking won't work against your infection. For example, an infection caused by a virus can't be treated with antibiotics. Staying on an antibiotic when you don't need it is more likely to be harmful than helpful.      You may experience side effects from your antibiotic.    Like all medications, antibiotics have side effects. Some of these can be serious.    Let you healthcare team know if you have any known allergies when you are admitted to the hospital.    One significant side effect of nearly all antibiotics is the risk of severe and sometimes deadly diarrhea caused by Clostridium difficile (C. Difficile). This occurs when a person takes antibiotics because some good germs are destroyed. Antibiotic use allows C. diificile to take over, putting  patients at high risk for this serious infection.    As a patient or caregiver, it is important to understand your or your loved one's antibiotic treatment. It is especially important for caregivers to speak up when patients can't speak for themselves. Here are some important questions to ask your healthcare team.    What infection is this antibiotic treating and how do you know I have that infection?    What side effects might occur from this antibiotic?    How long will I need to take this antibiotic?    Is it safe to take this antibiotic with other medications or supplements (e.g., vitamins) that I am taking?     Are there any special directions I need to know about taking this antibiotic? For example, should I take it with food?    How will I be monitored to know whether my infection is responding to the antibiotic?    What tests may help to make sure the right antibiotic is prescribed for me?      Information provided by:  www.cdc.gov/getsmart  U.S. Department of Health and Human Services  Centers for disease Control and Prevention  National Center for Emerging and Zoonotic Infectious Diseases  Division of Healthcare Quality Promotion        Information about OPIOIDS     PRESCRIPTION OPIOIDS: WHAT YOU NEED TO KNOW   We gave you an opioid (narcotic) pain medicine. It is important to manage your pain, but opioids are not always the best choice. You should first try all the other options your care team gave you. Take this medicine for as short a time (and as few doses) as possible.     These medicines have risks:    DO NOT drive when on new or higher doses of pain medicine. These medicines can affect your alertness and reaction times, and you could be arrested for driving under the influence (DUI). If you need to use opioids long-term, talk to your care team about driving.    DO NOT operate heave machinery    DO NOT do any other dangerous activities while taking these medicines.     DO NOT drink any alcohol while  taking these medicines.      If the opioid prescribed includes acetaminophen, DO NOT take with any other medicines that contain acetaminophen. Read all labels carefully. Look for the word  acetaminophen  or  Tylenol.  Ask your pharmacist if you have questions or are unsure.    You can get addicted to pain medicines, especially if you have a history of addiction (chemical, alcohol or substance dependence). Talk to your care team about ways to reduce this risk.    Store your pills in a secure place, locked if possible. We will not replace any lost or stolen medicine. If you don t finish your medicine, please throw away (dispose) as directed by your pharmacist. The [place] Pollution Control Agency has more information about safe disposal: https://www.pca.[facility].mn.us/living-green/managing-unwanted-medications.     All opioids tend to cause constipation. Drink plenty of water and eat foods that have a lot of fiber, such as fruits, vegetables, prune juice, apple juice and high-fiber cereal. Take a laxative (Miralax, milk of magnesia, Colace, Senna) if you don t move your bowels at least every other day.              Medication List: This is a list of all your medications and when to take them. Check marks below indicate your daily home schedule. Keep this list as a reference.      Medications           Morning Afternoon Evening Bedtime As Needed    ALLOPURINOL PO   Take 100 mg by mouth daily                                cephALEXin 500 MG capsule   Commonly known as:  KEFLEX   Take 1 capsule (500 mg) by mouth 2 times daily for 7 days Start with 2 tabs this afternoon, one more at bedtime tonight. Filled as duricef.                                HYDROcodone-acetaminophen 5-325 MG per tablet   Commonly known as:  NORCO   Take 1-2 tablets by mouth every 4 hours as needed for other (Moderate to Severe Pain) Use when not using percocet                                  ASK your doctor about these medications            Morning Afternoon Evening Bedtime As Needed    * hydrOXYzine 25 MG capsule   Commonly known as:  VISTARIL   Take 1 capsule (25 mg) by mouth every 6 hours as needed for other (take with norco or percocet to enhance pain relief)   Ask about: Which instructions should I use?                                * hydrOXYzine 25 MG capsule   Commonly known as:  VISTARIL   Take 1 capsule (25 mg) by mouth every 6 hours as needed for itching or other Take with percocet to enhance pain relief   Ask about: Which instructions should I use?                                * ondansetron 4 MG ODT tab   Commonly known as:  ZOFRAN-ODT   Take 2 tablets (8 mg) by mouth every 6 hours as needed for nausea   Ask about: Which instructions should I use?                                * ondansetron 4 MG ODT tab   Commonly known as:  ZOFRAN-ODT   Take 1 tablet (4 mg) by mouth every 6 hours as needed for nausea   Ask about: Which instructions should I use?                                * oxyCODONE-acetaminophen 5-325 MG per tablet   Commonly known as:  PERCOCET   Take 1-2 tablets by mouth every 4 hours as needed for pain   Ask about: Which instructions should I use?                                * oxyCODONE-acetaminophen 5-325 MG per tablet   Commonly known as:  PERCOCET   Take 1-2 tablets by mouth every 4 hours as needed for pain   Ask about: Which instructions should I use?                                * Notice:  This list has 6 medication(s) that are the same as other medications prescribed for you. Read the directions carefully, and ask your doctor or other care provider to review them with you.

## 2018-07-06 NOTE — ANESTHESIA POSTPROCEDURE EVALUATION
Patient: Crys Mendoza    Procedure(s):  Rhinoplasty revision - Wound Class: II-Clean Contaminated    Diagnosis:Cosmetic  Diagnosis Additional Information: No value filed.    Anesthesia Type:  General, ETT    Note:  Anesthesia Post Evaluation    Patient location during evaluation: PACU  Patient participation: Able to fully participate in evaluation  Level of consciousness: awake and alert  Pain management: adequate  Airway patency: patent  Cardiovascular status: acceptable  Respiratory status: acceptable  Hydration status: acceptable  PONV: none     Anesthetic complications: None    Comments: Some initial emergence delirium on arrival to PACU, given precedex. Doing well now.        Last vitals:  Vitals:    07/06/18 1010 07/06/18 1015 07/06/18 1030   BP:  102/66    Resp: 15 16 14   Temp:      SpO2: 91% 92% 92%         Electronically Signed By: Monty Arthur MD  July 6, 2018  10:33 AM

## 2018-07-06 NOTE — ANESTHESIA CARE TRANSFER NOTE
Patient: Crys Mendoza    Procedure(s):  Rhinoplasty revision - Wound Class: II-Clean Contaminated    Diagnosis: Cosmetic  Diagnosis Additional Information: No value filed.    Anesthesia Type:   No value filed.     Note:  Airway :Nasal Cannula  Patient transferred to:PACU  Comments: Awake, exchanging well, sats 99%< Report to RN.      Vitals: (Last set prior to Anesthesia Care Transfer)    CRNA VITALS  7/6/2018 0903 - 7/6/2018 0942      7/6/2018             Pulse: 120    SpO2: 98 %    Resp Rate (observed): 20                Electronically Signed By: VEE Chavez CRNA  July 6, 2018  9:42 AM

## 2018-07-06 NOTE — ADDENDUM NOTE
Addendum  created 07/06/18 1046 by Melody Perez APRN CRNA    Anesthesia Intra Flowsheets edited, Anesthesia Intra Meds edited

## 2018-07-06 NOTE — ANESTHESIA PREPROCEDURE EVALUATION
Crys Mendoza is a 52 year old male with a PMH of  Cosmetic who is scheduled for Procedure(s):  Rhinoplasty revision - Wound Class: II-Clean Contaminated    NPO Status: Adequate.  > 6 hours solids, > 2 hours clear liquids.       Past Surgical History:   Procedure Laterality Date     APPENDECTOMY       COSMETIC RHINOPLASTY N/A 8/18/2017    Procedure: COSMETIC RHINOPLASTY;  Rhinoplasty and chin implant;  Surgeon: Matthew Cardenas MD;  Location: [facility] OR     ENT SURGERY      tonsilectomy     OTOPLASTY       SOFT TISSUE SURGERY      keloid removal       Anesthesia Evaluation     . Pt has had prior anesthetic. Type: General    No history of anesthetic complications          ROS/MED HX    ENT/Pulmonary:      (-) tobacco use, asthma and COPD   Neurologic:      (-) CVA, TIA and Neuropathy   Cardiovascular:        (-) hypertension, CAD, irregular heartbeat/palpitations and stent   METS/Exercise Tolerance:     Hematologic:        (-) anemia   Musculoskeletal:         GI/Hepatic:        (-) GERD and liver disease   Renal/Genitourinary:     (+) Nephrolithiasis ,    (-) renal disease   Endo:      (-) Type I DM, Type II DM and thyroid disease   Psychiatric:         Infectious Disease:  - neg infectious disease ROS       Malignancy:         Other:                     Physical Exam  Normal systems: cardiovascular, pulmonary and dental    Airway   Mallampati: II  TM distance: >3 FB  Neck ROM: full    Dental     Cardiovascular   Rhythm and rate: regular and normal      Pulmonary    breath sounds clear to auscultation                    Anesthesia Plan      History & Physical Review  History and physical reviewed and following examination; no interval change.    ASA Status:  1 .        Plan for General and ETT with Intravenous induction. Maintenance will be TIVA.    PONV prophylaxis:  Ondansetron (or other 5HT-3) and Dexamethasone or Solumedrol      - No scop patch or midazolam per patient request.      Postoperative  Care  Postoperative pain management:  Oral pain medications and Multi-modal analgesia.      Consents  Anesthetic plan, risks, benefits and alternatives discussed with:  Patient..        Monty Arthur MD  7:48 AM July 6, 2018                       .

## 2018-07-06 NOTE — BRIEF OP NOTE
preop diagnosis: cosmetic tip deformity  Postop diagnosis: same  Operation:revision tip rhinoplasty  EBL : minimal  Assist: linus Griffin  Complications: none  Findings: none  Specimens: none  Condition: extubated and stable to PAR    Matthew Cardenas MD

## 2018-07-06 NOTE — DISCHARGE INSTRUCTIONS
[facility]  Same-Day Surgery   Adult Discharge Orders & Instructions   For 24 hours after surgery  1. Get plenty of rest.  A responsible adult must stay with you for at least 24 hours after you leave the hospital.   2. Do not drive or use heavy equipment.  If you have weakness or tingling, don't drive or use heavy equipment until this feeling goes away.  3. Do not drink alcohol.  4. Avoid strenuous or risky activities.  Ask for help when climbing stairs.   5. You may feel lightheaded.  IF so, sit for a few minutes before standing.  Have someone help you get up.   6. If you have nausea (feel sick to your stomach): Drink only clear liquids such as apple juice, ginger ale, broth or 7-Up.  Rest may also help.  Be sure to drink enough fluids.  Move to a regular diet as you feel able.  7. You may have a slight fever. Call the doctor if your fever is over 100 F (37.7 C) (taken under the tongue) or lasts longer than 24 hours.  8. You may have a dry mouth, a sore throat, muscle aches or trouble sleeping.  These should go away after 24 hours.  9. Do not make important or legal decisions.   Call your doctor for any of the followin.  Signs of infection (fever, growing tenderness at the surgery site, a large amount of drainage or bleeding, severe pain, foul-smelling drainage, redness, swelling).    2. It has been over 8 to 10 hours since surgery and you are still not able to urinate (pass water).    3.  Headache for over 24 hours.    4.  Numbness, tingling or weakness the day after surgery (if you had spinal anesthesia).

## 2018-07-06 NOTE — IP AVS SNAPSHOT
[facility] [place]    [address] [place] [address]    Phone:  [phone]                                       After Visit Summary   7/6/2018    Crys Mendoza    MRN: [id number]           After Visit Summary Signature Page     I have received my discharge instructions, and my questions have been answered. I have discussed any challenges I see with this plan with the nurse or doctor.    ..........................................................................................................................................  Patient/Patient Representative Signature      ..........................................................................................................................................  Patient Representative Print Name and Relationship to Patient    ..................................................               ................................................  Date                                            Time    ..........................................................................................................................................  Reviewed by Signature/Title    ...................................................              ..............................................  Date                                                            Time

## 2018-07-07 NOTE — OP NOTE
Procedure Date: 07/06/2018      DATE OF SURGERY:  07/06/2018      LOCATION:  [facility].      SURGEON:  Matthew Cardenas MD      PREOPERATIVE DIAGNOSIS:  Over projected tip following open septorhinoplasty 1 year ago.      POSTOPERATIVE DIAGNOSIS:  Over projected tip following open septorhinoplasty 1 year ago.      PROCEDURE PERFORMED:  Revision open tip rhinoplasty.      INDICATIONS:  The patient is a 52-year-old male whom we performed open septorhinoplasty approximately 1 year ago.  The patient likes his nose overall.  The patient had a significant dorsal nasal hump which was removed.  He also had a wide tip which was made more narrow.  I intentionally left the tip over projected as the patient is of   descent and has fairly thick nasal skin.  The skin retracted to a degree of which was more efficient than I expected given his genetic background and his skin site.  This left the tip over projected in relation to his dorsum.  He is taken to the operating room at this time at no cost from our office.  However, he did pay the facility fee for [facility] including all anesthetic cost.  The patient understands the risks of the operation include a chance of persistent nasal deformity, bleeding, infection, hematoma, hypertrophic scarring, asymmetry, and possible dissatisfaction with cosmetic outcome.  The patient has been seen on multiple occasions in our clinic for preoperative consultation and preoperative preparation for surgery.  He provides informed consent for the procedure.      DESCRIPTION OF PROCEDURE:  The patient was taken to the operating room and placed in supine position on the operating table.  A successful general endotracheal anesthetic was then induced using an oral PRIYA tube taped in the midline.  The columella was injected with 1% Xylocaine with 1:100,000 epinephrine.  I then injected in transcartilaginous approach injecting the nasal tip  and dorsum.  I then injected my incision lines for open rhinoplasty.  Incisions from his previous open rhinoplasty could be seen as thin byline scars.  Externally, the area where the facial nerve crosses under the nasal alar groove was again injected for vasoconstriction.  Cocaine 4% on neurosurgical pledgets were instilled into the patient's nasal vestibules for vasoconstriction.      The patient's face was then prepped and draped in routine sterile fashion.  The eyes were protected using Lacrilube and 3M Tegaderm dressings.  The patient's head was then prepped and draped in routine sterile fashion.      A transcolumellar stair-step incision was made which was connected to the intranasal incisions which followed the caudal margins of the lower lateral cartilages.  Nose was opened in fashion typical for open rhinoplasty.  Once the nose was opened, dissection proceeded with a Storz stitch scissors following my Waylon tip graft and then following the lower lateral cartilages over the dome then following the upper lateral cartilages onto the nasal dorsum.  I then dissected using a Storz stitch scissors and removed my Waylon tip graft.  I then dissected the lower lateral cartilages free from the surrounding scar tissue.  The lower lateral cartilages were then also dissected free from the columellar strut graft which had been placed which was responsible for the height of the tip.  After  the lower lateral cartilages from the columellar strut graft, the tip was freely mobilized and to recede the tip easily.  The tip was recessed approximately 4 mm.  I then replaced a 25-gauge needle securing the lower lateral cartilages to the  graft.  A series of mattressing sutures were then placed to hold the lower lateral cartilages at appropriate heights, suturing it to the  graft.  I then dissected beneath the domes of the lower lateral cartilages for tip defining sutures.  These tip defining sutures were  placed using 5-0 PDS in mattressing fashion.  Next, dome gathering sutures were placed using 4-0 PDS sutures in an interrupted fashion.  Next, the Waylon tip graft was cleaned of all scar tissue and reinserted at a lower position, suturing again in place with 5-0 PDS sutures in an interrupted fashion.  The skin was brought down and it had very nice relation between the tip and the dorsum of the nose.  The nose was then copiously irrigated with saline.  Closure consisted of 6-0 Monocryl, closing the transcolumellar incision.  This was followed by 6-0 Prolene sutures in simple interrupted fashion.  Intranasal incisions were closed using 5-0 chromic gut suture in interrupted fashion.  A petite small Denver nasal splint was then placed on the dorsum of the nose.  First, I applied Mastisol to the dorsum of nose, then two 1/4 inch Steri-Strips in longitudinal fashion, then my horizontal Steri-Strips provided with the Denver splint kit.  The Denver splint was then set in place.  At the end of the procedure, 0.25% Marcaine was used to inject the bilateral infraorbital nerves and under the lip for analgesia as well as the supraorbital nerves.  A nasal drip pad was placed under the patient's nose.  It should be noted that bacitracin was placed over all intranasal incision lines as well as the transcolumellar incision line.      OPERATIVE TIME:  The patient was in the room at 7:14 a.m.  Injection timeout was 07:31.  Operative timeout 7:45, so all anesthesia time had been used by the time I was able to make my incision.  Operative start time 7:47 a.m.  It should be noted there was a 6-minute delay as the MD anesthesiologist was not available as he was performing a block.  Operation was completed at 9:29 a.m.  We were out of the room at 9:33 a.m.  The operation was scheduled for 2.5 hours.  My operative time was 1 hour and 43 minutes.         DOMINICK LAUGHLIN MD             D: 07/07/2018   T: 07/07/2018   MT: ELLA      Name:      HEARDCHANTEL ELIZALDE   MRN:      [id number]        Account:        [id number]   :      1965           Procedure Date: 2018      Document: [id number]

## 2020-03-04 NOTE — TELEPHONE ENCOUNTER
[facility] DOS 08/18/18 PAYMENT of $3200 paid in full for [facility] with Dr. Cardenas. No  PB fees to be collected.    osteomyelitis/discitis  considering the provided history today. No evidence of active process at  this time although there is focal pain in this location consider follow-up  MRI evaluation    Abdomen/pelvis: No evidence of acute process in the abdomen or pelvis. CT Chest Pulmonary Embolism W Contrast  Narrative: EXAMINATION:  CTA OF THE CHEST; CT OF THE ABDOMEN AND PELVIS WITH CONTRAST 2/20/2020 1:28 pm    TECHNIQUE:  CTA of the chest was performed after the administration of intravenous  contrast.  Multiplanar reformatted images are provided for review. MIP  images are provided for review. Dose modulation, iterative reconstruction,  and/or weight based adjustment of the mA/kV was utilized to reduce the  radiation dose to as low as reasonably achievable.; CT of the abdomen and  pelvis was performed with the administration of intravenous contrast.  Multiplanar reformatted images are provided for review. Dose modulation,  iterative reconstruction, and/or weight based adjustment of the mA/kV was  utilized to reduce the radiation dose to as low as reasonably achievable. COMPARISON:  November 9, 2018    HISTORY:  ORDERING SYSTEM PROVIDED HISTORY: ho ivdu abd pain ho septic embolism  TECHNOLOGIST PROVIDED HISTORY:  Reason for exam:->ho ivdu abd pain ho septic embolism  Reason for Exam: abd pain ho iv drug use hep C/ CENTRALIZED ABD PAIN WO N/V/D  Acuity: Acute  Type of Exam: Initial; ORDERING SYSTEM PROVIDED HISTORY: abd pain ho iv drug  use hep c  TECHNOLOGIST PROVIDED HISTORY:  If patient is on cardiac monitor and/or pulse ox, they may be taken off  cardiac monitor and pulse ox, left on O2 if currently on. All monitors  reattached when patient returns to room.   Additional Contrast?->None  Reason for exam:->abd pain ho iv drug use hep c  Reason for Exam: abd pain ho iv drug use hep c / CENTRALIZED ABD PAIN X 4  WEEKS  Acuity: Acute  Type of Exam: Initial    FINDINGS:  Pulmonary Arteries: Pulmonary arteries are Encounter Reviewed:   Pertinent PMH, FH, SH is reviewed below. Last EGD: none  Last Colonoscopy: none    Review of available records reveals:   Wt Readings from Last 50 Encounters:   03/04/20 202 lb (91.6 kg)   02/20/20 200 lb (90.7 kg)   09/06/19 200 lb (90.7 kg)   12/10/18 223 lb (101.2 kg)   11/14/18 217 lb 1.6 oz (98.5 kg)   10/16/18 220 lb 14.4 oz (100.2 kg)   09/19/18 213 lb 10 oz (96.9 kg)   09/18/18 210 lb (95.3 kg)   09/17/18 209 lb (94.8 kg)   09/04/18 210 lb (95.3 kg)   06/07/18 223 lb (101.2 kg)   02/08/18 210 lb (95.3 kg)   11/12/17 200 lb (90.7 kg)       No components found for: HGBA1C  BP Readings from Last 3 Encounters:   03/04/20 108/76   02/20/20 (!) 142/96   09/06/19 123/87     Health Maintenance   Topic Date Due    Hepatitis A vaccine (1 of 2 - Risk 2-dose series) 09/13/1981    Varicella vaccine (1 of 2 - 2-dose childhood series) 09/13/1981    DTaP/Tdap/Td vaccine (1 - Tdap) 09/13/1991    Hepatitis B vaccine (1 of 3 - Risk 3-dose series) 09/13/1999    Flu vaccine (1) 09/01/2019    Shingles Vaccine (1 of 2) 09/13/2030    HIV screen  Completed    Hib vaccine  Aged Out    Meningococcal (ACWY) vaccine  Aged Out    Pneumococcal 0-64 years Vaccine  Aged Out       No components found for: [facility]     PAST MEDICAL HISTORY     Past Medical History:   Diagnosis Date    Bacteremia 9/18/18, 09/04/2018    staph aureus    Endocarditis     Hepatitis C 06/04/2016    Antibody +    History of staph pneumonia     IV drug abuse ([facility] [address] 08/2018     FAMILY HISTORY     Family History   Problem Relation Age of Onset    No Known Problems Mother     No Known Problems Father     No Known Problems Sister      SOCIAL HISTORY     Social History     Socioeconomic History    Marital status: Single     Spouse name: Not on file    Number of children: Not on file    Years of education: Not on file    Highest education level: Not on file   Occupational History    Not on file   Social Needs    Financial resource strain: Not on file    Food insecurity:     Worry: Not on file     Inability: Not on file    Transportation needs:     Medical: Not on file     Non-medical: Not on file   Tobacco Use    Smoking status: Former Smoker     Packs/day: 1.00     Types: E-Cigarettes    Smokeless tobacco: Current User     Types: Chew   Substance and Sexual Activity    Alcohol use: No    Drug use: No     Types: Opiates , Other-see comments, IV     Comment: detox in sept    Sexual activity: Not on file   Lifestyle    Physical activity:     Days per week: Not on file     Minutes per session: Not on file    Stress: Not on file   Relationships    Social connections:     Talks on phone: Not on file     Gets together: Not on file     Attends Sikh service: Not on file     Active member of club or organization: Not on file     Attends meetings of clubs or organizations: Not on file     Relationship status: Not on file    Intimate partner violence:     Fear of current or ex partner: Not on file     Emotionally abused: Not on file     Physically abused: Not on file     Forced sexual activity: Not on file   Other Topics Concern    Not on file   Social History Narrative    Not on file     SURGICAL HISTORY   No past surgical history on file. CURRENT MEDICATIONS   (This list may include medications prescribed during this encounter as epic can not insert only the list prior to this encounter.)  Current Outpatient Rx   Medication Sig Dispense Refill    polyethylene glycol (MIRALAX) POWD powder Take 238 g by mouth daily Take as directed for colonoscopy 255 g 0    oxyCODONE-acetaminophen (PERCOCET) 7.5-325 MG per tablet Take 1 tablet by mouth every 6 hours for 3 days.  Intended supply: 30 days 12 tablet 0    dicyclomine (BENTYL) 10 MG capsule Take 1 capsule by mouth 4 times daily (before meals and nightly) 120 capsule 3    sucralfate (CARAFATE) 1 GM tablet Take 1 tablet by mouth 4 times daily 120 tablet 3    ondansetron (ZOFRAN) 4 MG tablet Take 1 tablet by mouth every 8 hours as needed for Nausea 20 tablet 0    acetaminophen (APAP EXTRA STRENGTH) 500 MG tablet Take 1 tablet by mouth every 6 hours as needed for Pain 120 tablet 3    Lactobacillus (PROBIOTIC ACIDOPHILUS) TABS Take 1 tablet by mouth 2 times daily 60 tablet 0     ALLERGIES   No Known Allergies  IMMUNIZATIONS     Immunization History   Administered Date(s) Administered    Influenza, Quadv, IM, (6 mo and older Fluzone, Flulaval, Fluarix and 3 yrs and older Afluria) 12/10/2018     REVIEW OF SYSTEMS   See HPI for further details and pertinent postiives. Negative for the following:  Constitutional: Negative for weight change. Negative for appetite change and fatigue. HENT: Negative for nosebleeds, sore throat, mouth sores, and voice change. Respiratory: Negative for cough, choking and chest tightness. Cardiovascular: Negative for chest pain   Gastrointestinal: See HPI  Musculoskeletal: Negative for arthralgias. Skin: Negative for pallor. Neurological: Negative for weakness and light-headedness. Hematological: Negative for adenopathy. Does not bruise/bleed easily. Psychiatric/Behavioral: Negative for suicidal ideas. PHYSICAL EXAM   VITAL SIGNS: /76 (Site: Left Upper Arm, Position: Sitting, Cuff Size: Large Adult)   Ht 6' (1.829 m)   Wt 202 lb (91.6 kg)   BMI 27.40 kg/m²   Wt Readings from Last 3 Encounters:   03/04/20 202 lb (91.6 kg)   02/20/20 200 lb (90.7 kg)   09/06/19 200 lb (90.7 kg)     Constitutional: Well developed, Well nourished, No acute distress, Non-toxic appearance. HENT: Normocephalic, Atraumatic, Bilateral external ears normal, Oropharynx moist, No oral exudates, Nose normal.   Eyes: Conjunctiva normal, No discharge. Neck: Normal range of motion, No tenderness, Supple, No stridor. Lymphatic: No cervical, subclavian, or axillary lymphadenopathy.   Cardiovascular: Normal heart rate, Normal rhythm, No murmurs, No rubs, No gallops. Thorax & Lungs: Normal breath sounds, No respiratory distress, No wheezing, No chest tenderness. No gynecomastia. Abdomen: scars consistent with stated surgeries, no hernias, no HSM, soft NTND   Rectal:  Deferred. Skin: Warm, Dry, No erythema, No rash. No bruising. No spider hemangiomas. Back: No tenderness, No CVA tenderness. Lower Extremities: Intact distal pulses, No edema, No tenderness, No cyanosis, No clubbing. Neurologic: Alert & oriented x 3, Normal motor function, Normal sensory function, No focal deficits noted. No asterixis. RADIOLOGY/PROCEDURES       FINAL IMPRESSION     Orders Placed This Encounter   Procedures    COLONOSCOPY W/ OR W/O BIOPSY     Scheduling Instructions:      Schedule with anesthesia provided diprivan. Please provide prep of choice instructions and prescription. General guidelines for holding blood thinners/anticoagulants around endoscopic procedure are but patients are encouraged to check with their prescribing physician. The patient may hold Plavix, Effient, Brilinta 5 days prior to the procedure unless:       A drug eluting stent has been placed within past 12 months. A nondrug eluting stent has been placed within past 1 month. Coumadin may be held 4 days prior to the procedure unless:        Mechanical mitral valve replacement (requires heparin bridge while Coumadin held and is managed by pharmacy)      Pradaxa, Xarelto, Eliquis may be held 2-3 days prior to procedure. According to pharmacokinetics of the drug, package insert, cardiology practice patterns, and T1/2 of theses drugs (12 hrs), Eliquis and Xarelto are held 48hrs prior to any procedure, including major surgical procedures w/o       increased bleeding.  That is usually the standard of care, as coagulation would/should be normalized at 48hrs.       Every attempt should be made to maintain ASA 81mg per day throughout the lyndsey-operative period in patients with diagnosis of ASHD. These recommendations may need to be modified by the provider/ based on risk /benefit analysis of the procedure and the patients history. If anticoagulation can not be held because recent cardiac stent, elective endoscopic procedures should be delayed until they have received the minimum duration of recommended antiplatlet therapy and it can safely be held. Again if unsure, patient should discuss with prescribing physician/service. If anticoagulation can not be stopped, endoscopic procedures can still be performed either diagnostically at a somewhat higher risk. Understand that any therapeutic procedure where anything beyond looking is performed, carries higher risks. For this reason without overt bleeding other testing       such as cologuard may be more appropriate. High risk endoscopic procedures that require stopping antiplatelet and anticoagulation therapy include polypectomy, biliary or pancreatic sphincterotomy, pneumatic or bougie dilation, PEG placement, therapeutic balloon-assisted enteroscopy, EUS and FNA, tumor ablation by any technique,       cystogastrostomy,and treatment of varices. Order Specific Question:   Screening or Diagnostic? Answer:   Diagnostic    Hepatitis C Genotype     Standing Status:   Future     Standing Expiration Date:   3/4/2021    Hepatitis C RNA, quantitative, PCR     Standing Status:   Future     Standing Expiration Date:   4/4/2020    EGD     Scheduling Instructions:      Schedule with anesthesia provided diprivan sedation. Please provide prep of choice instructions and prescription. General guidelines for holding blood thinners/anticoagulants around endoscopic procedure are but patients are encouraged to check with their prescribing physician.             The patient may hold Plavix, Effient, Brilinta 5 days prior to the procedure unless:       A drug eluting stent has been cystogastrostomy,and treatment of varices. Order Specific Question:   Screening or Diagnostic? Answer:   Chris Schulz was seen today for new patient. Diagnoses and all orders for this visit:    Epigastric pain  -     EGD  -     oxyCODONE-acetaminophen (PERCOCET) 7.5-325 MG per tablet; Take 1 tablet by mouth every 6 hours for 3 days. Intended supply: 30 days    Diarrhea, unspecified type  -     COLONOSCOPY W/ OR W/O BIOPSY  -     bisacodyl (DULCOLAX) 5 MG EC tablet; Take 4 tablets by mouth once for 1 dose Take as directed for colonoscopy. -     polyethylene glycol (MIRALAX) POWD powder; Take 238 g by mouth daily Take as directed for colonoscopy    Rectal bleeding  -     COLONOSCOPY W/ OR W/O BIOPSY  -     bisacodyl (DULCOLAX) 5 MG EC tablet; Take 4 tablets by mouth once for 1 dose Take as directed for colonoscopy. -     polyethylene glycol (MIRALAX) POWD powder; Take 238 g by mouth daily Take as directed for colonoscopy    History of hepatitis C  -     Hepatitis C Genotype; Future  -     Hepatitis C RNA, quantitative, PCR; Future    Weight loss  -     EGD  -     COLONOSCOPY W/ OR W/O BIOPSY        ORDERED FUTURE/PENDING TESTS     Lab Frequency Next Occurrence       FOLLOWUP   Return for EGD & Colonoscopy. Jorge Hwang 3/4/20 2:26 PM    CC:  No primary care provider on file.

## (undated) DEVICE — DRAPE SPLIT EENT 76X124" 3X28" 9447

## (undated) DEVICE — SU CHROMIC 4-0 RB-1 27" U203H

## (undated) DEVICE — SPONGE COTTONOID 1/2X3" 80-1407

## (undated) DEVICE — CATH TRAY FOLEY 16FR DRAINAGE BAG STATLOCK 899916

## (undated) DEVICE — BLADE KNIFE BEAVER MICROSHARP BLUE 7514

## (undated) DEVICE — SYR EAR BULB 3OZ 0035830

## (undated) DEVICE — BLADE KNIFE SURG 15 371115

## (undated) DEVICE — DRSG TEGADERM 2 3/8X2 3/4" 1624W

## (undated) DEVICE — SU PDS II 5-0 P-3 18" Z493G

## (undated) DEVICE — GLOVE PROTEXIS W/NEU-THERA 7.5  2D73TE75

## (undated) DEVICE — PACK MINOR SBA15MIFSE

## (undated) DEVICE — ADH LIQUID MASTISOL TOPICAL VIAL 2-3ML 0523-48

## (undated) DEVICE — NDL 25GA 1.5" 305127

## (undated) DEVICE — SU CHROMIC 5-0 P-3 18" 687G

## (undated) DEVICE — SU PDS II 4-0 P-3 18" Z494G

## (undated) DEVICE — DRSG GAUZE 4X4" TRAY 6939

## (undated) DEVICE — MARKER SKIN DOUBLE TIP W/FLEXI-RULER W/LABELS

## (undated) DEVICE — SU PROLENE 5-0 P-3 18" 8698G

## (undated) DEVICE — NDL 27GA 1.25" 305136

## (undated) DEVICE — DRSG STERI STRIP 1/4X3" R1541

## (undated) DEVICE — BLADE KNIFE SURG 15C 371716

## (undated) DEVICE — SPONGE PACK VAGINAL 2"X9

## (undated) DEVICE — GOWN XLG DISP 9545

## (undated) DEVICE — SU VICRYL 3-0 RB-1 27" UND J215H

## (undated) DEVICE — ESU NDL COLORADO MICRO 3CM STR N103A

## (undated) DEVICE — SPLINT AQUAPLAST 6X18"

## (undated) DEVICE — SU VICRYL 4-0 P-3 18" UND  J494H

## (undated) DEVICE — SPLINT NASAL DENVER PETITE 1500 SERIES 10-1500-05KP

## (undated) DEVICE — GLOVE PROTEXIS W/NEU-THERA 6.5  2D73TE65

## (undated) DEVICE — SU PDS II 3-0 RB-1 27" Z305H

## (undated) DEVICE — APPLICATORS COTTON TIP 6" X10

## (undated) DEVICE — SYR 10ML LL W/O NDL

## (undated) DEVICE — PACK ACL SUPPLEMENT STD

## (undated) DEVICE — SU PROLENE 6-0 P-1 18" 8697G

## (undated) DEVICE — Device

## (undated) DEVICE — SOL WATER IRRIG 1000ML BOTTLE 07139-09

## (undated) DEVICE — SUCTION CANISTER MEDIVAC LINER 1500ML W/LID 65651-515

## (undated) DEVICE — ESU CLEANER TIP 31142717

## (undated) DEVICE — CATH TRAY FOLEY SURESTEP 16FR DRAIN BAG STATOCK A899916

## (undated) DEVICE — SU VICRYL 3-0 PS-2 18" UND J497H

## (undated) DEVICE — PREP POVIDONE IODINE USP 10% TOPICAL SOL 64537161

## (undated) DEVICE — PREP POVIDONE IODINE SWABS X3

## (undated) DEVICE — SU PLAIN FAST ABSORB 5-0 PC-1 18" 1915G

## (undated) DEVICE — SPECIMEN CONTAINER 4OZ

## (undated) RX ORDER — ACETAMINOPHEN 325 MG/1
TABLET ORAL
Status: DISPENSED
Start: 2018-07-06

## (undated) RX ORDER — LIDOCAINE HYDROCHLORIDE 20 MG/ML
INJECTION, SOLUTION INFILTRATION; PERINEURAL
Status: DISPENSED
Start: 2018-07-06

## (undated) RX ORDER — LIDOCAINE HYDROCHLORIDE AND EPINEPHRINE 10; 10 MG/ML; UG/ML
INJECTION, SOLUTION INFILTRATION; PERINEURAL
Status: DISPENSED
Start: 2018-07-06

## (undated) RX ORDER — LIDOCAINE HYDROCHLORIDE 10 MG/ML
INJECTION, SOLUTION EPIDURAL; INFILTRATION; INTRACAUDAL; PERINEURAL
Status: DISPENSED
Start: 2018-07-06

## (undated) RX ORDER — PROPOFOL 10 MG/ML
INJECTION, EMULSION INTRAVENOUS
Status: DISPENSED
Start: 2018-07-06

## (undated) RX ORDER — GLYCOPYRROLATE 0.2 MG/ML
INJECTION INTRAMUSCULAR; INTRAVENOUS
Status: DISPENSED
Start: 2018-07-06

## (undated) RX ORDER — GABAPENTIN 300 MG/1
CAPSULE ORAL
Status: DISPENSED
Start: 2018-07-06

## (undated) RX ORDER — LIDOCAINE HYDROCHLORIDE 20 MG/ML
INJECTION, SOLUTION EPIDURAL; INFILTRATION; INTRACAUDAL; PERINEURAL
Status: DISPENSED
Start: 2018-07-06

## (undated) RX ORDER — ONDANSETRON 2 MG/ML
INJECTION INTRAMUSCULAR; INTRAVENOUS
Status: DISPENSED
Start: 2018-07-06

## (undated) RX ORDER — LABETALOL HYDROCHLORIDE 5 MG/ML
INJECTION, SOLUTION INTRAVENOUS
Status: DISPENSED
Start: 2018-07-06

## (undated) RX ORDER — GINSENG 100 MG
CAPSULE ORAL
Status: DISPENSED
Start: 2018-07-06

## (undated) RX ORDER — BUPIVACAINE HYDROCHLORIDE 2.5 MG/ML
INJECTION, SOLUTION EPIDURAL; INFILTRATION; INTRACAUDAL
Status: DISPENSED
Start: 2018-07-06

## (undated) RX ORDER — DEXAMETHASONE SODIUM PHOSPHATE 4 MG/ML
INJECTION, SOLUTION INTRA-ARTICULAR; INTRALESIONAL; INTRAMUSCULAR; INTRAVENOUS; SOFT TISSUE
Status: DISPENSED
Start: 2018-07-06

## (undated) RX ORDER — CEFAZOLIN SODIUM 2 G/100ML
INJECTION, SOLUTION INTRAVENOUS
Status: DISPENSED
Start: 2018-07-06

## (undated) RX ORDER — FENTANYL CITRATE 50 UG/ML
INJECTION, SOLUTION INTRAMUSCULAR; INTRAVENOUS
Status: DISPENSED
Start: 2018-07-06

## (undated) RX ORDER — EPINEPHRINE 1 MG/ML
INJECTION, SOLUTION INTRAMUSCULAR; SUBCUTANEOUS
Status: DISPENSED
Start: 2018-07-06